# Patient Record
Sex: MALE | Race: WHITE | Employment: STUDENT | URBAN - METROPOLITAN AREA
[De-identification: names, ages, dates, MRNs, and addresses within clinical notes are randomized per-mention and may not be internally consistent; named-entity substitution may affect disease eponyms.]

---
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
## 2022-01-31 ENCOUNTER — ANESTHESIA EVENT (OUTPATIENT)
Dept: SURGERY | Age: 22
End: 2022-01-31
Payer: COMMERCIAL

## 2022-02-01 ENCOUNTER — APPOINTMENT (OUTPATIENT)
Dept: GENERAL RADIOLOGY | Age: 22
End: 2022-02-01
Attending: ORTHOPAEDIC SURGERY
Payer: COMMERCIAL

## 2022-02-01 ENCOUNTER — HOSPITAL ENCOUNTER (OUTPATIENT)
Age: 22
Setting detail: OUTPATIENT SURGERY
Discharge: HOME | End: 2022-02-01
Attending: ORTHOPAEDIC SURGERY | Admitting: ORTHOPAEDIC SURGERY
Payer: COMMERCIAL

## 2022-02-01 ENCOUNTER — ANESTHESIA (OUTPATIENT)
Dept: SURGERY | Age: 22
End: 2022-02-01
Payer: COMMERCIAL

## 2022-02-01 VITALS
RESPIRATION RATE: 16 BRPM | OXYGEN SATURATION: 100 % | WEIGHT: 252 LBS | HEART RATE: 78 BPM | TEMPERATURE: 98.9 F | BODY MASS INDEX: 32.35 KG/M2 | SYSTOLIC BLOOD PRESSURE: 112 MMHG | DIASTOLIC BLOOD PRESSURE: 69 MMHG

## 2022-02-01 DIAGNOSIS — G89.18 ACUTE POST-OPERATIVE PAIN: Primary | ICD-10-CM

## 2022-02-01 PROCEDURE — 74011250637 HC RX REV CODE- 250/637: Performed by: ANESTHESIOLOGY

## 2022-02-01 PROCEDURE — 2709999900 HC NON-CHARGEABLE SUPPLY: Performed by: ORTHOPAEDIC SURGERY

## 2022-02-01 PROCEDURE — 74011250636 HC RX REV CODE- 250/636: Performed by: ANESTHESIOLOGY

## 2022-02-01 PROCEDURE — 74011250636 HC RX REV CODE- 250/636: Performed by: NURSE PRACTITIONER

## 2022-02-01 PROCEDURE — 20680 REMOVAL OF IMPLANT DEEP: CPT | Performed by: ORTHOPAEDIC SURGERY

## 2022-02-01 PROCEDURE — 74011000250 HC RX REV CODE- 250: Performed by: NURSE ANESTHETIST, CERTIFIED REGISTERED

## 2022-02-01 PROCEDURE — 74011250636 HC RX REV CODE- 250/636: Performed by: NURSE ANESTHETIST, CERTIFIED REGISTERED

## 2022-02-01 PROCEDURE — 77030002933 HC SUT MCRYL J&J -A: Performed by: ORTHOPAEDIC SURGERY

## 2022-02-01 PROCEDURE — 74011000250 HC RX REV CODE- 250: Performed by: ORTHOPAEDIC SURGERY

## 2022-02-01 PROCEDURE — 76210000020 HC REC RM PH II FIRST 0.5 HR: Performed by: ORTHOPAEDIC SURGERY

## 2022-02-01 PROCEDURE — 76010000160 HC OR TIME 0.5 TO 1 HR INTENSV-TIER 1: Performed by: ORTHOPAEDIC SURGERY

## 2022-02-01 PROCEDURE — 77030008825 HC WRE FIX K ZIMM -B: Performed by: ORTHOPAEDIC SURGERY

## 2022-02-01 PROCEDURE — 76060000032 HC ANESTHESIA 0.5 TO 1 HR: Performed by: ORTHOPAEDIC SURGERY

## 2022-02-01 PROCEDURE — 76210000006 HC OR PH I REC 0.5 TO 1 HR: Performed by: ORTHOPAEDIC SURGERY

## 2022-02-01 RX ORDER — BUPIVACAINE HYDROCHLORIDE 5 MG/ML
INJECTION, SOLUTION EPIDURAL; INTRACAUDAL AS NEEDED
Status: DISCONTINUED | OUTPATIENT
Start: 2022-02-01 | End: 2022-02-01 | Stop reason: HOSPADM

## 2022-02-01 RX ORDER — OXYCODONE HYDROCHLORIDE 5 MG/1
5 TABLET ORAL
Qty: 20 TABLET | Refills: 0 | Status: SHIPPED | OUTPATIENT
Start: 2022-02-01 | End: 2022-02-06

## 2022-02-01 RX ORDER — SODIUM CHLORIDE, SODIUM LACTATE, POTASSIUM CHLORIDE, CALCIUM CHLORIDE 600; 310; 30; 20 MG/100ML; MG/100ML; MG/100ML; MG/100ML
75 INJECTION, SOLUTION INTRAVENOUS CONTINUOUS
Status: DISCONTINUED | OUTPATIENT
Start: 2022-02-01 | End: 2022-02-01 | Stop reason: HOSPADM

## 2022-02-01 RX ORDER — SODIUM CHLORIDE 0.9 % (FLUSH) 0.9 %
5-40 SYRINGE (ML) INJECTION AS NEEDED
Status: DISCONTINUED | OUTPATIENT
Start: 2022-02-01 | End: 2022-02-01 | Stop reason: HOSPADM

## 2022-02-01 RX ORDER — PROPOFOL 10 MG/ML
INJECTION, EMULSION INTRAVENOUS AS NEEDED
Status: DISCONTINUED | OUTPATIENT
Start: 2022-02-01 | End: 2022-02-01 | Stop reason: HOSPADM

## 2022-02-01 RX ORDER — SODIUM CHLORIDE 0.9 % (FLUSH) 0.9 %
5-40 SYRINGE (ML) INJECTION EVERY 8 HOURS
Status: DISCONTINUED | OUTPATIENT
Start: 2022-02-01 | End: 2022-02-01 | Stop reason: HOSPADM

## 2022-02-01 RX ORDER — KETOROLAC TROMETHAMINE 30 MG/ML
INJECTION, SOLUTION INTRAMUSCULAR; INTRAVENOUS AS NEEDED
Status: DISCONTINUED | OUTPATIENT
Start: 2022-02-01 | End: 2022-02-01 | Stop reason: HOSPADM

## 2022-02-01 RX ORDER — MIDAZOLAM HYDROCHLORIDE 1 MG/ML
2 INJECTION, SOLUTION INTRAMUSCULAR; INTRAVENOUS
Status: DISCONTINUED | OUTPATIENT
Start: 2022-02-01 | End: 2022-02-01 | Stop reason: HOSPADM

## 2022-02-01 RX ORDER — NALOXONE HYDROCHLORIDE 0.4 MG/ML
0.2 INJECTION, SOLUTION INTRAMUSCULAR; INTRAVENOUS; SUBCUTANEOUS AS NEEDED
Status: DISCONTINUED | OUTPATIENT
Start: 2022-02-01 | End: 2022-02-01 | Stop reason: HOSPADM

## 2022-02-01 RX ORDER — ACETAMINOPHEN 500 MG
1000 TABLET ORAL ONCE
Status: COMPLETED | OUTPATIENT
Start: 2022-02-01 | End: 2022-02-01

## 2022-02-01 RX ORDER — CLINDAMYCIN PHOSPHATE 900 MG/50ML
900 INJECTION INTRAVENOUS ONCE
Status: COMPLETED | OUTPATIENT
Start: 2022-02-01 | End: 2022-02-01

## 2022-02-01 RX ORDER — PROPOFOL 10 MG/ML
INJECTION, EMULSION INTRAVENOUS
Status: DISCONTINUED | OUTPATIENT
Start: 2022-02-01 | End: 2022-02-01 | Stop reason: HOSPADM

## 2022-02-01 RX ORDER — FENTANYL CITRATE 50 UG/ML
INJECTION, SOLUTION INTRAMUSCULAR; INTRAVENOUS AS NEEDED
Status: DISCONTINUED | OUTPATIENT
Start: 2022-02-01 | End: 2022-02-01 | Stop reason: HOSPADM

## 2022-02-01 RX ORDER — CIPROFLOXACIN 750 MG/1
750 TABLET, FILM COATED ORAL 2 TIMES DAILY
Qty: 6 TABLET | Refills: 0 | Status: SHIPPED | OUTPATIENT
Start: 2022-02-01

## 2022-02-01 RX ORDER — LIDOCAINE HYDROCHLORIDE 10 MG/ML
0.1 INJECTION INFILTRATION; PERINEURAL AS NEEDED
Status: DISCONTINUED | OUTPATIENT
Start: 2022-02-01 | End: 2022-02-01 | Stop reason: HOSPADM

## 2022-02-01 RX ORDER — OXYCODONE AND ACETAMINOPHEN 5; 325 MG/1; MG/1
1 TABLET ORAL AS NEEDED
Status: DISCONTINUED | OUTPATIENT
Start: 2022-02-01 | End: 2022-02-01 | Stop reason: HOSPADM

## 2022-02-01 RX ORDER — LIDOCAINE HYDROCHLORIDE 20 MG/ML
INJECTION, SOLUTION EPIDURAL; INFILTRATION; INTRACAUDAL; PERINEURAL AS NEEDED
Status: DISCONTINUED | OUTPATIENT
Start: 2022-02-01 | End: 2022-02-01 | Stop reason: HOSPADM

## 2022-02-01 RX ORDER — HYDROMORPHONE HYDROCHLORIDE 2 MG/ML
0.5 INJECTION, SOLUTION INTRAMUSCULAR; INTRAVENOUS; SUBCUTANEOUS
Status: DISCONTINUED | OUTPATIENT
Start: 2022-02-01 | End: 2022-02-01 | Stop reason: HOSPADM

## 2022-02-01 RX ADMIN — PROPOFOL 40 MG: 10 INJECTION, EMULSION INTRAVENOUS at 10:59

## 2022-02-01 RX ADMIN — PROPOFOL 100 MCG/KG/MIN: 10 INJECTION, EMULSION INTRAVENOUS at 11:03

## 2022-02-01 RX ADMIN — SODIUM CHLORIDE, SODIUM LACTATE, POTASSIUM CHLORIDE, AND CALCIUM CHLORIDE 75 ML/HR: 600; 310; 30; 20 INJECTION, SOLUTION INTRAVENOUS at 08:32

## 2022-02-01 RX ADMIN — PROPOFOL 40 MG: 10 INJECTION, EMULSION INTRAVENOUS at 11:08

## 2022-02-01 RX ADMIN — PROPOFOL 50 MG: 10 INJECTION, EMULSION INTRAVENOUS at 10:57

## 2022-02-01 RX ADMIN — FENTANYL CITRATE 25 MCG: 50 INJECTION INTRAMUSCULAR; INTRAVENOUS at 11:05

## 2022-02-01 RX ADMIN — PROPOFOL 30 MG: 10 INJECTION, EMULSION INTRAVENOUS at 11:23

## 2022-02-01 RX ADMIN — CLINDAMYCIN PHOSPHATE 900 MG: 900 INJECTION, SOLUTION INTRAVENOUS at 10:49

## 2022-02-01 RX ADMIN — KETOROLAC TROMETHAMINE 30 MG: 30 INJECTION, SOLUTION INTRAMUSCULAR; INTRAVENOUS at 11:31

## 2022-02-01 RX ADMIN — FENTANYL CITRATE 50 MCG: 50 INJECTION INTRAMUSCULAR; INTRAVENOUS at 10:57

## 2022-02-01 RX ADMIN — PROPOFOL 40 MG: 10 INJECTION, EMULSION INTRAVENOUS at 11:17

## 2022-02-01 RX ADMIN — FENTANYL CITRATE 25 MCG: 50 INJECTION INTRAMUSCULAR; INTRAVENOUS at 11:02

## 2022-02-01 RX ADMIN — LIDOCAINE HYDROCHLORIDE 40 MG: 20 INJECTION, SOLUTION EPIDURAL; INFILTRATION; INTRACAUDAL; PERINEURAL at 10:57

## 2022-02-01 RX ADMIN — ACETAMINOPHEN 1000 MG: 500 TABLET ORAL at 08:30

## 2022-02-01 NOTE — ANESTHESIA POSTPROCEDURE EVALUATION
Procedure(s):  RIGHT FOOT HARDWARE REMOVAL. total IV anesthesia    Anesthesia Post Evaluation      Multimodal analgesia: multimodal analgesia used between 6 hours prior to anesthesia start to PACU discharge  Patient location during evaluation: PACU  Patient participation: complete - patient participated  Level of consciousness: awake and alert  Pain management: adequate  Airway patency: patent  Anesthetic complications: no  Cardiovascular status: acceptable  Respiratory status: acceptable  Hydration status: acceptable  Post anesthesia nausea and vomiting:  none  Final Post Anesthesia Temperature Assessment:  Normothermia (36.0-37.5 degrees C)      INITIAL Post-op Vital signs:   Vitals Value Taken Time   BP 96/70 02/01/22 1215   Temp 37.1 °C (98.7 °F) 02/01/22 1146   Pulse 82 02/01/22 1218   Resp 16 02/01/22 1215   SpO2 97 % 02/01/22 1218   Vitals shown include unvalidated device data.

## 2022-02-01 NOTE — BRIEF OP NOTE
Brief Postoperative Note    Patient: Vickey Anthony  YOB: 2000  MRN: [id number]    Date of Procedure: 2/1/2022     Pre-Op Diagnosis: Dislocation of tarsometatarsal joint of right foot, initial encounter [S93.324A]    Post-Op Diagnosis: Same as preoperative diagnosis.       Procedure(s):  RIGHT FOOT HARDWARE REMOVAL    Surgeon(s):  Estefani Zarate MD    Surgical Assistant: None    Anesthesia: MAC     Estimated Blood Loss (mL): Minimal    Complications: None    Specimens: * No specimens in log *     Implants: * No implants in log *    Drains: * No LDAs found *    Findings:     Electronically Signed by Danitza Domínguez MD on 2/1/2022 at 11:43 AM

## 2022-02-01 NOTE — OP NOTES
FULL OP NOTE    PATIENT NAME: Mari Johnson  MRN: [id number]    DATE OF SURGERY: 2/1/2022    PREOPERATIVE DIAGNOSIS: Dislocation of tarsometatarsal joint of right foot, initial encounter [S93.324A]      POSTOPERATIVE DIAGNOSIS: Dislocation of tarsometatarsal joint of right foot, initial encounter [S93.324A]      PROCEDURE: Right foot planned staged hardware removal, 20680    SURGEON: Anatoly Farfan MD    ASSISTANT: Mohamud Coats NP  An assistant was required for positioning, retraction, and wound closure for this procedure. HARDWARE: * No implants in log *  INDICATIONS: This patient is a 25y.o. year old male with a history of Dislocation of tarsometatarsal joint of right foot, initial encounter Constance Ren who has failed conservative therapy and desires surgical treatment. Risks and benefits of the procedure including, but not limited to, anesthetic complications as well as surgical complications including damage to nerves and blood vessels, risk of infection, risk of incomplete pain relief, risk of malunion, nonunion and need for additional surgery have been discussed with the patient who wishes to proceed. PROCEDURE IN DETAIL: A time out was done to confirm the operating procedure, surgeon, patient and site. During a preop surgical timeout the right lower extremity was identified as the correct surgical site and prepped and draped in the standard sterile fashion using ChloraPrep solution. A field block was obtained with 0.5% plain Marcaine. The patient's previous dorsal approach to the TMT joint was then reopened. The underlying hardware was identified and was removed without difficulty. There was good stability with anatomic reduction noted in the first TMT joint. The medial approach to the Lisfranc screw was also opened in a separate incision. The screw was also removed without difficulty. Image guidance confirmed anatomic reduction with adequate removal of hardware.   The wounds were then irrigated and closed using Monocryl nylon sutures. A sterile dressing was then applied. Anesthesia was discontinued. The patient was transferred back to recovery bed. He was taken to recovery in satisfactory condition. Appear to tolerate the procedure well. There were no apparent surgical or anesthetic complications. All needle and sponge counts were correct. TOURNIQUET TIME: Approx 25  minutes. SPECIMENS: none    ESTIMATED BLOOD LOSS: min mL.

## 2022-02-01 NOTE — PERIOP NOTES
Discharge instructions reviewed with family and patient at bedside. All questions answered at this time. Handout in hand on family.

## 2022-02-01 NOTE — ANESTHESIA PREPROCEDURE EVALUATION
Relevant Problems   No relevant active problems       Anesthetic History   No history of anesthetic complications            Review of Systems / Medical History  Patient summary reviewed, nursing notes reviewed and pertinent labs reviewed    Pulmonary  Within defined limits                 Neuro/Psych   Within defined limits           Cardiovascular                  Exercise tolerance: >4 METS     GI/Hepatic/Renal  Within defined limits              Endo/Other  Within defined limits           Other Findings              Physical Exam    Airway  Mallampati: II  TM Distance: 4 - 6 cm  Neck ROM: normal range of motion   Mouth opening: Normal     Cardiovascular  Regular rate and rhythm,  S1 and S2 normal,  no murmur, click, rub, or gallop             Dental  No notable dental hx       Pulmonary  Breath sounds clear to auscultation               Abdominal         Other Findings            Anesthetic Plan    ASA: 1  Anesthesia type: total IV anesthesia          Induction: Intravenous  Anesthetic plan and risks discussed with: Patient and Parent / Delilah Bruce

## 2022-02-01 NOTE — H&P
Outpatient Surgery History and Physical:  Luciana Moses was seen and examined. CHIEF COMPLAINT:   Right foot. PE:     Visit Vitals  /68 (BP 1 Location: Right arm, BP Patient Position: Supine)   Pulse 75   Temp 97.9 °F (36.6 °C)   Resp 18   Wt 252 lb (114.3 kg)   SpO2 96%   BMI 32.35 kg/m²       Heart:   Regular rhythm      Lungs:  Are clear      Past Medical History: There are no problems to display for this patient. Surgical History:   Past Surgical History:   Procedure Laterality Date    HX ORTHOPAEDIC Right 11/2021    RIGHT FOOT FIRST AND SECOND TARSOMETATARSAL OPEN REDUCTION INTERNAL FIXATION    HX WISDOM TEETH EXTRACTION         Social History: Patient  reports that he has never smoked. He has never used smokeless tobacco. He reports previous alcohol use. He reports previous drug use. Family History: No family history on file. Allergies: Reviewed per EMR  Allergies   Allergen Reactions    Pcn [Penicillins] Rash       Medications:    No current facility-administered medications on file prior to encounter. Current Outpatient Medications on File Prior to Encounter   Medication Sig    Cetirizine (ZyrTEC) 10 mg cap Take  by mouth nightly.  creatine monohydrate (CREATINE PO) Take  by mouth daily. The surgery is planned for the RIGHT FOOT HARDWARE REMOVAL . History and physical has been reviewed. The patient has been examined. There have been no significant clinical changes since the completion of the originally dated History and Physical.  Patient identified by surgeon; surgical site was confirmed by patient and surgeon. The patient is here today for outpatient surgery. I have examined the patient, no changes are noted in the patient's medical status. Necessity for the procedure/care is still present and the history and physical above is current. See the office notes for the full long term history of the problem.   Please see the recent office notes for the musculoskeletal examination.     Signed By: Esther Sewell NP     February 1, 2022 8:37 AM

## 2022-02-17 ENCOUNTER — HOSPITAL ENCOUNTER (OUTPATIENT)
Dept: PHYSICAL THERAPY | Age: 22
Discharge: HOME | End: 2022-02-17
Payer: COMMERCIAL

## 2022-02-17 DIAGNOSIS — S93.324A DISLOCATION OF TARSOMETATARSAL JOINT OF RIGHT FOOT, INITIAL ENCOUNTER: ICD-10-CM

## 2022-02-17 PROCEDURE — 97161 PT EVAL LOW COMPLEX 20 MIN: CPT

## 2022-02-17 PROCEDURE — 97110 THERAPEUTIC EXERCISES: CPT

## 2022-02-17 NOTE — THERAPY EVALUATION
Yvonna Delay  : 2000 [facility] [address]  [address] [place] [address], [place] [address].  [phone]   [phone]        OUTPATIENT PHYSICAL THERAPY:Initial Assessment 2022         ICD-10: Treatment Diagnosis: Pain in right foot (M79.671) and Stiffness of right foot, not elsewhere classified (M25.674) and Difficulty in walking, not elsewhere classified (R26.2)  Precautions/Allergies:   Pcn [penicillins]   Fall Risk Score:     Ambulatory/Rehab Services H2 Model Falls Risk Assessment    Risk Factors:       (1)  Gender [Male] Ability to Rise from Chair:       (0)  Ability to rise in a single movement    Falls Prevention Plan:       No modifications necessary   Total: (5 or greater = High Risk): 1     [facility]. All Rights Reserved. [facility] [employer] Patent #[id number]. Federal Law prohibits the replication, distribution or use without written permission from [facility]     MD Orders: evaluate and treat MEDICAL/REFERRING DIAGNOSIS:  Dislocation of tarsometatarsal joint of right foot, initial encounter [R73.069T]   DATE OF ONSET: s/p ORIF of lis-franc on . Hardware removal on . REFERRING PHYSICIAN: Vernida Felty, MD  RETURN PHYSICIAN APPOINTMENT: not currrently scheduled     INITIAL ASSESSMENT:  Mr. Tiffani Palmer presents to therapy following above surgical repair. He has decreased mobility of the foot and ankle, specifically of the talocrural joint and tightness of the calf musculature. He has an appropriately stable midfoot. He has decreased strength throughout the foot and lower leg due to atrophy and immobilization. He has pain with plantarflexion and weightbearing through the forefoot. He also demonstrates weakness of the intrinsic foot musculature. Impairments cause difficulty with all ambulation activities. Skilled therapy is required to return to prior level of function.     PROBLEM LIST (Impacting functional limitations):  1. Decreased Strength  2. Decreased ADL/Functional Activities  3. Decreased Ambulation Ability/Technique  4. Decreased Balance  5. Increased Pain  6. Decreased Activity Tolerance  7. Decreased Flexibility/Joint Mobility INTERVENTIONS PLANNED:  1. Balance Exercise  2. Family Education  3. Gait Training  4. Home Exercise Program (HEP)  5. Manual Therapy  6. Neuromuscular Re-education/Strengthening  7. Range of Motion (ROM)  8. Therapeutic Activites  9. Therapeutic Exercise/Strengthening  10. modalities    TREATMENT PLAN:  Effective Dates: 2/17/2022 TO 5/19/2022 (90 days). Frequency/Duration: 3 times a week for 90 Days  GOALS: (Goals have been discussed and agreed upon with patient.)  Short-Term Functional Goals: Time Frame: 2 weeks  1. Patient will be independent with HEP. 2. Patient will restore ankle mobility to symmetric with uninvolved as measured by weight-bearing lunge test to normalize gait on uneven surfaces. 3. Patient will report pain <2/10 along incision sites to decrease sensitivity. Discharge Goals: Time Frame: 4 weeks  1. Patient will be able to perform 30 SL heel raises to normalize capacity of the calf for return to prior level of function. 2. Patient will demonstrate symmetric SL hop and stick to normalize dynamic stability of the limb. 3. Patient will have LEFS of 80/80 to return to prior level of function. Rehabilitation Potential For Stated Goals: Excellent  Regarding Chelsea Buckner's therapy, I certify that the treatment plan above will be carried out by a therapist or under their direction. Thank you for this referral,  Yonatan Hong DPT       Referring Physician Signature: Darcy Mcbride MD              Date                      HISTORY:   History of Present Injury/Illness (Reason for Referral):  Patient presents to therapy following above surgical repair. Initial injury occurred on 10/30/21 while playing football.  The forefoot was stepped on and he felt a sharp pain when he tried to pull his foot away. He notes no complications with surgery. Following initial surgery, he was casted for 6 weeks, booted for another 4 weeks, and then progressed to shoes. Following hardware removal, he was in a boot until yesterday. He currently notes general soreness through the foot and feels sensitive along the incision sites. He also feels tight through the ankle and calf, and feels weak through the foot. He does note mild intermittent swelling, and he has been intermittently taking advil to manage soreness. Past Medical History/Comorbidities:   Mr. Luis Bowling  has no past medical history on file. Mr. Luis Bowling  has a past surgical history that includes hx wisdom teeth extraction and hx orthopaedic (Right, 11/2021). Social History/Living Environment:     Patient is a college student and lives on campus. 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 , Disp: , Rfl:    Date Last Reviewed:  2/17/2022   # of Personal Factors/Comorbidities that affect the Plan of Care: 0: LOW COMPLEXITY   EXAMINATION:   Observation/Orthostatic Postural Assessment:    Incision sites are well healed. Ambulates with mild decreased ankle rocker during right stance. Girth: 40.5cm R, 44cm L at calf; 29.5cm R, 28.5cm L at midfoot; figure 8 is 58cm B  Palpation:    Mild tenderness along incision sites  ROM:    WBLT: 30 ° R, 35 ° L  Soleus length is 10 ° in prone  Inversion, eversion, and plantarflexion are WNL  Great toe extension is 60 ° B   Hip extension and quad length are restricted  Strength:    Ankle DF: 4/5 R; 5/5 L  Inversion: 4/5 R with pain; 5/5 L  Eversion: 5/5 B  Plantarflexion 3/5 R with pain; 5/5 L  Special Tests:    Not applicable  Neurological Screen:  Grossly intact  Functional Mobility:   WNL  Balance:    Maintains single leg stance 5sec on R; 10+ on L   Body Structures Involved:  1. Nerves  2. Bones  3. Joints  4. Muscles  5. Ligaments Body Functions Affected:  1. Sensory/Pain  2. Neuromusculoskeletal  3. Movement Related Activities and Participation Affected:  1. General Tasks and Demands  2. Mobility  3. Self Care  4. Domestic Life  5. Interpersonal Interactions and Relationships  6. Community, Social and [place]   # of elements that affect the Plan of Care: 1-2: LOW COMPLEXITY   CLINICAL PRESENTATION:   Presentation: Stable and uncomplicated: LOW COMPLEXITY   CLINICAL DECISION MAKING:   Tool Used: Lower Extremity Functional Scale (LEFS)  Score:  Initial: 64/80 Most Recent: X/80 (Date: -- )   Interpretation of Score: 20 questions each scored on a 5 point scale with 0 representing \"extreme difficulty or unable to perform\" and 4 representing \"no difficulty\". The lower the score, the greater the functional disability. 80/80 represents no disability. Minimal detectable change is 9 points. Medical Necessity:   · Patient is expected to demonstrate progress in strength, range of motion, balance and coordination  ·  to increase independence with community mobility and return to prior level of function  · .  Reason for Services/Other Comments:  · Patient has demonstrated an improvement in functional level by independent performance of HEP.    · .   Use of outcome tool(s) and clinical judgement create a POC that gives a: Clear prediction of patient's progress: LOW COMPLEXITY     Total Treatment Duration:  PT Patient Time In/Time Out  Time In: 1400  Time Out: [facility], [facility]

## 2022-02-17 NOTE — PROGRESS NOTES
Obdulia Gomes [place]  : 2000  Primary: [address]  Secondary: [place] [address]  [phone]   [phone]      OUTPATIENT PHYSICAL THERAPY: Daily Treatment Note  2022    ICD-10: Treatment Diagnosis: Pain in right foot (M79.671) and Stiffness of right foot, not elsewhere classified (M25.674) and Difficulty in walking, not elsewhere classified (R26.2)  Effective Dates: 2022 TO 2022 (90 days). Frequency/Duration: 3 times a week for 90 Days  GOALS: (Goals have been discussed and agreed upon with patient.)  Short-Term Functional Goals: Time Frame: 2 weeks  1. Patient will be independent with HEP. 2. Patient will restore ankle mobility to symmetric with uninvolved as measured by weight-bearing lunge test to normalize gait on uneven surfaces. 3. Patient will report pain <2/10 along incision sites to decrease sensitivity. Discharge Goals: Time Frame: 4 weeks  1. Patient will be able to perform 30 SL heel raises to normalize capacity of the calf for return to prior level of function. 2. Patient will demonstrate symmetric SL hop and stick to normalize dynamic stability of the limb. 3. Patient will have LEFS of 80/80 to return to prior level of function. _________________________________________________________________________  Pre-treatment Symptoms/Complaints:  See initial evaluation. Pain: Initial: 210 Post Session:  No increase/10   Medications Last Reviewed:  2022  Updated Objective Findings:  Below measures from initial evaluation unless otherwise noted. Observation/Orthostatic Postural Assessment:    Incision sites are well healed. Ambulates with mild decreased ankle rocker during right stance.    Girth: 40.5cm R, 44cm L at calf; 29.5cm R, 28.5cm L at midfoot; figure 8 is 58cm B  Palpation:    Mild tenderness along incision sites  ROM: WBLT: 30 ° R, 35 ° L  Soleus length is 10 ° in prone  Inversion, eversion, and plantarflexion are WNL  Great toe extension is 60 ° B   Hip extension and quad length are restricted  Strength: Ankle DF: 4/5 R; 5/5 L  Inversion: 4/5 R with pain; 5/5 L  Eversion: 5/5 B  Plantarflexion 3/5 R with pain; 5/5 L  Special Tests:    Not applicable  Neurological Screen:  Grossly intact  Functional Mobility:   WNL  Balance:    Maintains single leg stance 5sec on R; 10+ on L  LEFS: 64/80     TREATMENT:   THERAPEUTIC ACTIVITY: ( see below for minutes): Therapeutic activities per grid below to improve mobility, strength, balance and coordination. Required minimal visual, verbal, manual and tactile cues to improve independence and safety with daily activities . THERAPEUTIC EXERCISE: (see below for minutes):  Exercises per grid below to improve mobility, strength, balance and coordination. Required minimal verbal and manual cues to promote proper body alignment, promote proper body posture and promote proper body mechanics. Progressed resistance, range, repetitions and complexity of movement as indicated. MANUAL THERAPY: (see below for minutes): Joint mobilization and Soft tissue mobilization was utilized and necessary because of the patient's restricted joint motion, painful spasm, loss of articular motion and restricted motion of soft tissue. MODALITIES: (see below for minutes):      to decrease pain    SELF CARE: (see below for minutes): Procedure(s) (per grid) utilized to improve and/or restore self-care/home management as related to dressing, bathing and grooming. Required minimal verbal cueing to facilitate activities of daily living skills and compensatory activities.      Date: 2/17/22       Modalities:                                Therapeutic Exercise: 25 min       Active warm up Knee hug, lunge x 1 lap       Incline board 1 min knee straight, bent each       Tband In/DF/Ev 3x15 each (green); seated toe flexion green 40x       Toe raise 3x15 leaning on wall                       Proprioceptive Activities:                                Manual Therapy:                        Therapeutic Activities:                                  HEP: see 2/17/22 flow sheet for specifics. (calf stretch at wall knees straight and bent 1 min ea), SLS with EC [id number], toe raise 3x15    MedBridge Portal  Treatment/Session Summary:    · Response to Treatment:  Patient is independent with performance of above described home program.  · Communication/Consultation:  None today  · Equipment provided today:  None today  · Recommendations/Intent for next treatment session: Next visit will focus on progression of strength and return to prior level of function.     Total Treatment Billable Duration:  45 minutes  PT Patient Time In/Time Out  Time In: 1400  Time Out: 301 [facility] 83, DPT    Future Appointments   Date Time Provider Dulce Maria Shafer   2/22/2022  2:45 PM Claudine Ferreira, KATIET [facility]   2/24/2022  2:45 PM Claudine Ferreira, KATIET [facility]   3/7/2022  8:45 AM Ferrer Apa, PT [facility]   3/9/2022  8:45 AM Ferrer Apa, PT [facility]   3/15/2022  4:15 PM Ferrer Apa, PT [facility]   3/17/2022  2:00 PM Ferrer Apa, PT [facility]   3/22/2022  4:15 PM Ferrer Apa, PT [facility]   3/24/2022  2:00 PM Ferrer Apa, PT [facility]   3/29/2022  4:15 PM Ferrer Apa, PT [facility]   3/31/2022  2:00 PM Norbertfman, Perla Blend, [facility]

## 2022-02-22 ENCOUNTER — HOSPITAL ENCOUNTER (OUTPATIENT)
Dept: PHYSICAL THERAPY | Age: 22
Discharge: HOME | End: 2022-02-22
Payer: COMMERCIAL

## 2022-02-22 PROCEDURE — 97110 THERAPEUTIC EXERCISES: CPT

## 2022-02-22 NOTE — PROGRESS NOTES
Linh Albrecht [place]  : 2000  Primary: [address]  Secondary: [place] [address]  [phone]   [phone]      OUTPATIENT PHYSICAL THERAPY: Daily Treatment Note  2022    ICD-10: Treatment Diagnosis: Pain in right foot (M79.671) and Stiffness of right foot, not elsewhere classified (M25.674) and Difficulty in walking, not elsewhere classified (R26.2)  Effective Dates: 2022 TO 2022 (90 days). Frequency/Duration: 3 times a week for 90 Days  GOALS: (Goals have been discussed and agreed upon with patient.)  Short-Term Functional Goals: Time Frame: 2 weeks  1. Patient will be independent with HEP. 2. Patient will restore ankle mobility to symmetric with uninvolved as measured by weight-bearing lunge test to normalize gait on uneven surfaces. 3. Patient will report pain <2/10 along incision sites to decrease sensitivity. Discharge Goals: Time Frame: 4 weeks  1. Patient will be able to perform 30 SL heel raises to normalize capacity of the calf for return to prior level of function. 2. Patient will demonstrate symmetric SL hop and stick to normalize dynamic stability of the limb. 3. Patient will have LEFS of 80/80 to return to prior level of function. _________________________________________________________________________  Pre-treatment Symptoms/Complaints:  Notes the incision still remains moderately tender. Feeling more comfortable walking around campus. Pain: Initial: 2/10    Hardware removal on: 22 Post Session:  No increase/10   Medications Last Reviewed:  2022  Updated Objective Findings:  Below measures from initial evaluation unless otherwise noted. Observation/Orthostatic Postural Assessment:    Incision sites are well healed. Ambulates with mild decreased ankle rocker during right stance.    Girth: 40.5cm R, 44cm L at calf; 29.5cm R, 28.5cm L at midfoot; figure 8 is 58cm B  Palpation:    Mild tenderness along incision sites  ROM:    WBLT: 30 ° R, 35 ° L  Soleus length is 10 ° in prone  Inversion, eversion, and plantarflexion are WNL  Great toe extension is 60 ° B   Hip extension and quad length are restricted  Strength: Ankle DF: 4/5 R; 5/5 L  Inversion: 4/5 R with pain; 5/5 L  Eversion: 5/5 B  Plantarflexion 3/5 R with pain; 5/5 L  Special Tests:    Not applicable  Neurological Screen:  Grossly intact  Functional Mobility:   WNL  Balance:    Maintains single leg stance 5sec on R; 10+ on L  LEFS: 64/80     TREATMENT:   THERAPEUTIC ACTIVITY: ( see below for minutes): Therapeutic activities per grid below to improve mobility, strength, balance and coordination. Required minimal visual, verbal, manual and tactile cues to improve independence and safety with daily activities . THERAPEUTIC EXERCISE: (see below for minutes):  Exercises per grid below to improve mobility, strength, balance and coordination. Required minimal verbal and manual cues to promote proper body alignment, promote proper body posture and promote proper body mechanics. Progressed resistance, range, repetitions and complexity of movement as indicated. MANUAL THERAPY: (see below for minutes): Joint mobilization and Soft tissue mobilization was utilized and necessary because of the patient's restricted joint motion, painful spasm, loss of articular motion and restricted motion of soft tissue. MODALITIES: (see below for minutes):      to decrease pain    SELF CARE: (see below for minutes): Procedure(s) (per grid) utilized to improve and/or restore self-care/home management as related to dressing, bathing and grooming. Required minimal verbal cueing to facilitate activities of daily living skills and compensatory activities.      Date: 2/17/22 2/22/22 (visit 2)      Modalities:                                Therapeutic Exercise: 25 min 40 min       Active warm up Knee hug, lunge x 1 lap Knee hug, SLS to heel raise with OH med ball press x1 lap each      Incline board 1 min knee straight, bent each 1 min knee straight and bent each      Tband In/DF/Ev 3x15 each (green); seated toe flexion green 40x Seated toe press green 50x      Toe raise 3x15 leaning on wall 3x20 leaning on wall      bike  5 min       MOBO  Static stance 2x1 min, with med/lat reach 1 min       SLDL   With KB pass through 15# [id number]      Heel raise  Standing 15x double, 3x8 SL; seated 100# 4x8                      Proprioceptive Activities:                                Manual Therapy:  5 min         MWM into closed chain DF; STM to medial incision              Therapeutic Activities:                                  HEP: see 2/17/22 flow sheet for specifics. (calf stretch at wall knees straight and bent 1 min ea), SLS with EC [id number], toe raise 3x15    MedBridge Portal  Treatment/Session Summary:    · Response to Treatment:  Following MWM he has no pinching at the anterior ankle with calf stretching. · Communication/Consultation:  None today  · Equipment provided today:  None today  · Recommendations/Intent for next treatment session: Next visit will focus on progression of strength and return to prior level of function.     Total Treatment Billable Duration:  45 minutes  PT Patient Time In/Time Out  Time In: 2314  Time Out: 1400 [facility], CATHERINE    Future Appointments   Date Time Provider Dulce Maria Shafer   2/24/2022  2:45 PM Edson Ramirez DPT [facility]   3/9/2022  7:00 PM Jaycee Stanley, PT [facility]   3/15/2022  4:15 PM Jaycee Stanley, PT [facility]   3/17/2022  2:00 PM Jaycee Stanley, PT [facility]   3/22/2022  4:15 PM Jaycee Stanley, PT [facility]   3/24/2022  2:00 PM [facility]   3/29/2022  4:15 PM Jaycee Stanley, PT [facility]   3/31/2022  2:00 PM Sharri Underwood, PT [facility]

## 2022-02-24 ENCOUNTER — HOSPITAL ENCOUNTER (OUTPATIENT)
Dept: PHYSICAL THERAPY | Age: 22
Discharge: HOME | End: 2022-02-24
Payer: COMMERCIAL

## 2022-02-24 PROCEDURE — 97110 THERAPEUTIC EXERCISES: CPT

## 2022-02-24 PROCEDURE — 97022 WHIRLPOOL THERAPY: CPT

## 2022-02-24 NOTE — PROGRESS NOTES
Michael Potter [place]  : 2000  Primary: [address]mo[address]c*  Secondary: [place] [address]  [phone]   [phone]      OUTPATIENT PHYSICAL THERAPY: Daily Treatment Note  2022    ICD-10: Treatment Diagnosis: Pain in right foot (M79.671) and Stiffness of right foot, not elsewhere classified (M25.674) and Difficulty in walking, not elsewhere classified (R26.2)  Effective Dates: 2022 TO 2022 (90 days). Frequency/Duration: 3 times a week for 90 Days  GOALS: (Goals have been discussed and agreed upon with patient.)  Short-Term Functional Goals: Time Frame: 2 weeks  1. Patient will be independent with HEP. 2. Patient will restore ankle mobility to symmetric with uninvolved as measured by weight-bearing lunge test to normalize gait on uneven surfaces. 3. Patient will report pain <2/10 along incision sites to decrease sensitivity. Discharge Goals: Time Frame: 4 weeks  1. Patient will be able to perform 30 SL heel raises to normalize capacity of the calf for return to prior level of function. 2. Patient will demonstrate symmetric SL hop and stick to normalize dynamic stability of the limb. 3. Patient will have LEFS of 80/80 to return to prior level of function. _________________________________________________________________________  Pre-treatment Symptoms/Complaints:  \"It's still a little sore, but it's feeling better. \"   Pain: Initial: 2/10    Hardware removal on: 22 Post Session:  No increase/10   Medications Last Reviewed:  2022  Updated Objective Findings:  Below measures from initial evaluation unless otherwise noted. Observation/Orthostatic Postural Assessment:    Incision sites are well healed. Ambulates with mild decreased ankle rocker during right stance.    Girth: 40.5cm R, 44cm L at calf; 29.5cm R, 28.5cm L at midfoot; figure 8 is 58cm B  Palpation:    Mild tenderness along incision sites  ROM:    WBLT: 30 ° R, 35 ° L  Soleus length is 10 ° in prone  Inversion, eversion, and plantarflexion are WNL  Great toe extension is 60 ° B   Hip extension and quad length are restricted  Strength: Ankle DF: 4/5 R; 5/5 L  Inversion: 4/5 R with pain; 5/5 L  Eversion: 5/5 B  Plantarflexion 3/5 R with pain; 5/5 L  Special Tests:    Not applicable  Neurological Screen:  Grossly intact  Functional Mobility:   WNL  Balance:    Maintains single leg stance 5sec on R; 10+ on L  LEFS: 64/80     TREATMENT:   THERAPEUTIC ACTIVITY: ( see below for minutes): Therapeutic activities per grid below to improve mobility, strength, balance and coordination. Required minimal visual, verbal, manual and tactile cues to improve independence and safety with daily activities . THERAPEUTIC EXERCISE: (see below for minutes):  Exercises per grid below to improve mobility, strength, balance and coordination. Required minimal verbal and manual cues to promote proper body alignment, promote proper body posture and promote proper body mechanics. Progressed resistance, range, repetitions and complexity of movement as indicated. MANUAL THERAPY: (see below for minutes): Joint mobilization and Soft tissue mobilization was utilized and necessary because of the patient's restricted joint motion, painful spasm, loss of articular motion and restricted motion of soft tissue. MODALITIES: (see below for minutes):      to decrease pain    SELF CARE: (see below for minutes): Procedure(s) (per grid) utilized to improve and/or restore self-care/home management as related to dressing, bathing and grooming. Required minimal verbal cueing to facilitate activities of daily living skills and compensatory activities.      Date: 2/17/22 2/22/22 (visit 2) 2/24/22 (visit 3)     Modalities:   10 min         Cold whirlpool for pain and swelling                     Therapeutic Exercise: 25 min 40 min  45 min Active warm up Knee hug, lunge x 1 lap Knee hug, SLS to heel raise with OH med ball press x1 lap each Knee hug, SLS to heel raise with OH med ball press x 1 lap each     Incline board 1 min knee straight, bent each 1 min knee straight and bent each repeat     Tband In/DF/Ev 3x15 each (green); seated toe flexion green 40x Seated toe press green 50x Lateral band walk at forefoot grey x 3 laps     Toe raise 3x15 leaning on wall 3x20 leaning on wall Shuffle walk x 1 lap     bike  5 min       MOBO  Static stance 2x1 min, with med/lat reach 1 min  repeat     SLDL   With KB pass through 15# [id number]      Heel raise  Standing 15x double, 3x8 SL; seated 100# 4x8 Standing 4x10 SL L1; seated 125# x10, 150# x10, 175# 2x10     RFESS   3x10 with heel raise at bottom             Proprioceptive Activities:                                Manual Therapy:  5 min  5 min        MWM into closed chain DF; STM to medial incision repeat             Therapeutic Activities:                                  HEP: see 2/17/22 flow sheet for specifics. (calf stretch at wall knees straight and bent 1 min ea), SLS with EC [id number], toe raise 3x15    [facility] Portal  Treatment/Session Summary:    · Response to Treatment:  Able to perform heel raises pain free. · Communication/Consultation:  None today  · Equipment provided today:  None today  · Recommendations/Intent for next treatment session: Next visit will focus on progression of strength and return to prior level of function.     Total Treatment Billable Duration:  55 minutes  PT Patient Time In/Time Out  Time In: 2440  Time Out: [address] [facility] Wally, DPT    Future Appointments   Date Time Provider Dulce Maria Shafer   2/28/2022  2:00 PM Chaparrita Lagos DPT [facility]   3/2/2022  2:45 PM Emily Ee, PT [facility]   3/9/2022  7:00 PM Emily Ee, PT [facility]   3/15/2022  4:15 PM Emily Ee, PT [facility]   3/17/2022  2:00 PM Eldon Chambers BESS, PT [facility]   3/22/2022  4:15 PM [facility]   3[facility]24/2022  2:00 [facility]   3/29/2022  4:15 PM Amina Glaser, PT [facility]   3/31/2022  2:00 PM [facility]endo[facility]

## 2022-02-28 ENCOUNTER — HOSPITAL ENCOUNTER (OUTPATIENT)
Dept: PHYSICAL THERAPY | Age: 22
Discharge: HOME | End: 2022-02-28
Payer: COMMERCIAL

## 2022-02-28 PROCEDURE — 97110 THERAPEUTIC EXERCISES: CPT

## 2022-02-28 PROCEDURE — 97022 WHIRLPOOL THERAPY: CPT

## 2022-02-28 NOTE — PROGRESS NOTES
Jade Holland [place]  : 2000  Primary: [address]*  Secondary: [place] [address] @ [address]  [phone]   [phone]      OUTPATIENT PHYSICAL THERAPY: Daily Treatment Note  2022    ICD-10: Treatment Diagnosis: Pain in right foot (M79.671) and Stiffness of right foot, not elsewhere classified (M25.674) and Difficulty in walking, not elsewhere classified (R26.2)  Effective Dates: 2022 TO 2022 (90 days). Frequency/Duration: 3 times a week for 90 Days  GOALS: (Goals have been discussed and agreed upon with patient.)  Short-Term Functional Goals: Time Frame: 2 weeks  1. Patient will be independent with HEP. 2. Patient will restore ankle mobility to symmetric with uninvolved as measured by weight-bearing lunge test to normalize gait on uneven surfaces. 3. Patient will report pain <2/10 along incision sites to decrease sensitivity. Discharge Goals: Time Frame: 4 weeks  1. Patient will be able to perform 30 SL heel raises to normalize capacity of the calf for return to prior level of function. 2. Patient will demonstrate symmetric SL hop and stick to normalize dynamic stability of the limb. 3. Patient will have LEFS of 80/80 to return to prior level of function. _________________________________________________________________________  Pre-treatment Symptoms/Complaints:  \"I was on my feet a good bit this weekend and it was a little sore, but not bad. \"  Pain: Initial: 2/10    Hardware removal on: 22 Post Session:  No increase/10   Medications Last Reviewed:  2022  Updated Objective Findings:  Below measures from initial evaluation unless otherwise noted. Observation/Orthostatic Postural Assessment:    Incision sites are well healed. Ambulates with mild decreased ankle rocker during right stance.    Girth: 40.5cm R, 44cm L at calf; 29.5cm R, 28.5cm L at midfoot; figure 8 is 58cm B  Palpation:    Mild tenderness along incision sites  ROM:    WBLT: 30 ° R, 35 ° L  Soleus length is 10 ° in prone  Inversion, eversion, and plantarflexion are WNL  Great toe extension is 60 ° B   Hip extension and quad length are restricted  Strength: Ankle DF: 4/5 R; 5/5 L  Inversion: 4/5 R with pain; 5/5 L  Eversion: 5/5 B  Plantarflexion 3/5 R with pain; 5/5 L  Special Tests:    Not applicable  Neurological Screen:  Grossly intact  Functional Mobility:   WNL  Balance:    Maintains single leg stance 5sec on R; 10+ on L  LEFS: 64/80     TREATMENT:   THERAPEUTIC ACTIVITY: ( see below for minutes): Therapeutic activities per grid below to improve mobility, strength, balance and coordination. Required minimal visual, verbal, manual and tactile cues to improve independence and safety with daily activities . THERAPEUTIC EXERCISE: (see below for minutes):  Exercises per grid below to improve mobility, strength, balance and coordination. Required minimal verbal and manual cues to promote proper body alignment, promote proper body posture and promote proper body mechanics. Progressed resistance, range, repetitions and complexity of movement as indicated. MANUAL THERAPY: (see below for minutes): Joint mobilization and Soft tissue mobilization was utilized and necessary because of the patient's restricted joint motion, painful spasm, loss of articular motion and restricted motion of soft tissue. MODALITIES: (see below for minutes):      to decrease pain    SELF CARE: (see below for minutes): Procedure(s) (per grid) utilized to improve and/or restore self-care/home management as related to dressing, bathing and grooming. Required minimal verbal cueing to facilitate activities of daily living skills and compensatory activities.      Date: 2/17/22 2/22/22 (visit 2) 2/24/22 (visit 3) 2/28/22 (visit 4)    Modalities:   10 min  10 min        Cold whirlpool for pain and swelling repeat Therapeutic Exercise: 25 min 40 min  45 min  45 min     Active warm up Knee hug, lunge x 1 lap Knee hug, SLS to heel raise with OH med ball press x1 lap each Knee hug, SLS to heel raise with OH med ball press x 1 lap each repeat    Incline board 1 min knee straight, bent each 1 min knee straight and bent each repeat 1 min knee straight and bent each    Tband In/DF/Ev 3x15 each (green); seated toe flexion green 40x Seated toe press green 50x Lateral band walk at forefoot grey x 3 laps     Toe raise 3x15 leaning on wall 3x20 leaning on wall Shuffle walk x 1 lap Shuffle walk x 1 lap    bike  5 min       MOBO  Static stance 2x1 min, with med/lat reach 1 min  repeat Static stance x 2 min, with med/lat reach 3x7    SLDL   With KB pass through 15# [id number]      Heel raise  Standing 15x double, 3x8 SL; seated 100# 4x8 Standing 4x10 SL L1; seated 125# x10, 150# x10, 175# 2x10 Standing 3x15 L1; seated 175# 4x10    RFESS   3x10 with heel raise at bottom             Proprioceptive Activities:                                Manual Therapy:  5 min  5 min  5 min       MWM into closed chain DF; STM to medial incision repeat repeat            Therapeutic Activities:                                  HEP: see 2/17/22 flow sheet for specifics. (calf stretch at wall knees straight and bent 1 min ea), SLS with EC [id number], toe raise 3x15    [facility]  Treatment/Session Summary:    · Response to Treatment: Toe walking remains mildly irritable, limiting further progression with gait drills. · Communication/Consultation:  None today  · Equipment provided today:  None today  · Recommendations/Intent for next treatment session: Next visit will focus on progression of strength and return to prior level of function.     Total Treatment Billable Duration:  55 minutes  PT Patient Time In/Time Out  Time In: 1615  Time Out: [address], DPT    Future Appointments   Date Time Provider Dulce Maria Shafer   3/2/2022  2:45 PM Talha Rosa, PT [facility]   3/9/2022  7:00 PM Talha Rosa, PT [facility]   3/15/2022  4:15 PM Talha Rosa, PT [facility]   3/17/2022  2:00 PM [facility]   3/22/2022  4:15 PM [facility]   3/24/2022  2:00 PM [facility]   3/29/2022  4:15 PM Talha Rosa, PT [facility]   3/31/2022  2:00 PM Ophelia Riley, PT [facility]

## 2022-03-02 ENCOUNTER — HOSPITAL ENCOUNTER (OUTPATIENT)
Dept: PHYSICAL THERAPY | Age: 22
Discharge: HOME | End: 2022-03-02
Payer: COMMERCIAL

## 2022-03-02 PROCEDURE — 97022 WHIRLPOOL THERAPY: CPT

## 2022-03-02 PROCEDURE — 97110 THERAPEUTIC EXERCISES: CPT

## 2022-03-02 NOTE — PROGRESS NOTES
Juanita Barclay  : 2000  Primary: [address]*  Secondary: [place] [address]  [phone]   [phone]      OUTPATIENT PHYSICAL THERAPY: Daily Treatment Note  3/2/2022    ICD-10: Treatment Diagnosis: Pain in right foot (M79.671) and Stiffness of right foot, not elsewhere classified (M25.674) and Difficulty in walking, not elsewhere classified (R26.2)  Effective Dates: 2022 TO 2022 (90 days). Frequency/Duration: 3 times a week for 90 Days  GOALS: (Goals have been discussed and agreed upon with patient.)  Short-Term Functional Goals: Time Frame: 2 weeks  1. Patient will be independent with HEP. 2. Patient will restore ankle mobility to symmetric with uninvolved as measured by weight-bearing lunge test to normalize gait on uneven surfaces. 3. Patient will report pain <2/10 along incision sites to decrease sensitivity. Discharge Goals: Time Frame: 4 weeks  1. Patient will be able to perform 30 SL heel raises to normalize capacity of the calf for return to prior level of function. 2. Patient will demonstrate symmetric SL hop and stick to normalize dynamic stability of the limb. 3. Patient will have LEFS of 80/80 to return to prior level of function. _________________________________________________________________________  Pre-treatment Symptoms/Complaints:  No pain today - wants to know if he can start jogging. Pain: Initial: 2/10    Hardware removal on: 22 Post Session:  No increase/10   Medications Last Reviewed:  3/2/2022  Updated Objective Findings:  Below measures from initial evaluation unless otherwise noted. Observation/Orthostatic Postural Assessment:    Incision sites are well healed. Ambulates with mild decreased ankle rocker during right stance.    Girth: 40.5cm R, 44cm L at calf; 29.5cm R, 28.5cm L at midfoot; figure 8 is 58cm B  Palpation:    Mild tenderness along incision sites  ROM:    WBLT: 30 ° R, 35 ° L  Soleus length is 10 ° in prone  Inversion, eversion, and plantarflexion are WNL  Great toe extension is 60 ° B   Hip extension and quad length are restricted  Strength: Ankle DF: 4/5 R; 5/5 L  Inversion: 4/5 R with pain; 5/5 L  Eversion: 5/5 B  Plantarflexion 3/5 R with pain; 5/5 L  Special Tests:    Not applicable  Neurological Screen:  Grossly intact  Functional Mobility:   WNL  Balance:    Maintains single leg stance 5sec on R; 10+ on L  LEFS: 64/80     TREATMENT:   THERAPEUTIC ACTIVITY: ( see below for minutes): Therapeutic activities per grid below to improve mobility, strength, balance and coordination. Required minimal visual, verbal, manual and tactile cues to improve independence and safety with daily activities . THERAPEUTIC EXERCISE: (see below for minutes):  Exercises per grid below to improve mobility, strength, balance and coordination. Required minimal verbal and manual cues to promote proper body alignment, promote proper body posture and promote proper body mechanics. Progressed resistance, range, repetitions and complexity of movement as indicated. MANUAL THERAPY: (see below for minutes): Joint mobilization and Soft tissue mobilization was utilized and necessary because of the patient's restricted joint motion, painful spasm, loss of articular motion and restricted motion of soft tissue. MODALITIES: (see below for minutes):      to decrease pain    SELF CARE: (see below for minutes): Procedure(s) (per grid) utilized to improve and/or restore self-care/home management as related to dressing, bathing and grooming. Required minimal verbal cueing to facilitate activities of daily living skills and compensatory activities.      Date: 2/17/22 2/22/22 (visit 2) 2/24/22 (visit 3) 2/28/22 (visit 4) 3/2/22 (visit 5)   Modalities:   10 min  10 min  10 min      Cold whirlpool for pain and swelling repeat Repeat Therapeutic Exercise: 25 min 40 min  45 min  45 min  34 min   Active warm up Knee hug, lunge x 1 lap Knee hug, SLS to heel raise with OH med ball press x1 lap each Knee hug, SLS to heel raise with OH med ball press x 1 lap each repeat DYnadisc cw/ccw circles dl standing 2 x 15 each, R single leg ap and side to side tips 2 x 15 each   Incline board 1 min knee straight, bent each 1 min knee straight and bent each repeat 1 min knee straight and bent each Repeat   Tband In/DF/Ev 3x15 each (green); seated toe flexion green 40x Seated toe press green 50x Lateral band walk at forefoot grey x 3 laps     Toe raise 3x15 leaning on wall 3x20 leaning on wall Shuffle walk x 1 lap Shuffle walk x 1 lap Shuffle walk w/ toes dorsiflexed - 1 lap   bike  5 min       MOBO  Static stance 2x1 min, with med/lat reach 1 min  repeat Static stance x 2 min, with med/lat reach 3x7 Static stance 1 min knee ext, 1 min knee flex, two way reach 2 x 10    SLDL   With KB pass through 15# [id number]      Heel raise  Standing 15x double, 3x8 SL; seated 100# 4x8 Standing 4x10 SL L1; seated 125# x10, 150# x10, 175# 2x10 Standing 3x15 L1; seated 175# 4x10 Standing: L/R single leg 12, 15, 15, standing off half roll 3 x 12 L/R, seated 4 xx 15 @ 175   RFESS   3x10 with heel raise at bottom          Medball 9# march w/ calf raise x 50, march w/ calf raise, sl rdl x 40'        Treadmill jog x 3 min. Soleus stretch w/ band ant ankle for TC post glide. 2 min. Proprioceptive Activities:                                Manual Therapy:  5 min  5 min  5 min  5 min     MWM into closed chain DF; STM to medial incision repeat repeat Metatarsal glides: 4-5 and 1 dorsal/plantar, talocrural distraction, post. Glides (all gr. 3)           Therapeutic Activities:                                  HEP: see 2/17/22 flow sheet for specifics.  (calf stretch at wall knees straight and bent 1 min ea), SLS with EC [id number], toe raise 3x15    [facility] Portal  Treatment/Session Summary:    · Response to Treatment:  No pain with increase in calf raises, toe walking or treadmill jogging x 3 min. OK to start short-duration jogging. · Communication/Consultation:  None today  · Equipment provided today:  None today  · Recommendations/Intent for next treatment session: Next visit will focus on progression of strength and return to prior level of function.     Total Treatment Billable Duration: 49 minutes  PT Patient Time In/Time Out  Time In: 3527  Time Out: [facility],     Future Appointments   Date Time Provider Dulce Maria Shafer   3/9/2022  7:00 PM Anushka Monique, PT [facility]   3/15/2022  4:15 PM Naif KELLOGG, PT [facility]   3/17/2022  2:00 PM Anushka Monique, PT [facility]   3/22/2022  4:15 PM Anushka Monique, PT [facility]   3/24/2022  2:00 PM Anushka Monique, PT [facility]   3/29/2022  4:15 PM Anushka oMnique, PT [facility]   3/31/2022  2:00 PM Torin Underwood, PT [facility]

## 2022-03-07 ENCOUNTER — APPOINTMENT (OUTPATIENT)
Dept: PHYSICAL THERAPY | Age: 22
End: 2022-03-07
Payer: COMMERCIAL

## 2022-03-08 ENCOUNTER — APPOINTMENT (OUTPATIENT)
Dept: PHYSICAL THERAPY | Age: 22
End: 2022-03-08
Payer: COMMERCIAL

## 2022-03-10 ENCOUNTER — APPOINTMENT (OUTPATIENT)
Dept: PHYSICAL THERAPY | Age: 22
End: 2022-03-10
Payer: COMMERCIAL

## 2022-03-15 ENCOUNTER — HOSPITAL ENCOUNTER (OUTPATIENT)
Dept: PHYSICAL THERAPY | Age: 22
Discharge: HOME | End: 2022-03-15
Payer: COMMERCIAL

## 2022-03-15 PROCEDURE — 97110 THERAPEUTIC EXERCISES: CPT

## 2022-03-15 PROCEDURE — 97016 VASOPNEUMATIC DEVICE THERAPY: CPT

## 2022-03-15 NOTE — PROGRESS NOTES
Margi Barclay  : 2000  Primary: [address] [facility]  Secondary: [place] [address] @ [address]  [phone]   [phone]      OUTPATIENT PHYSICAL THERAPY: Daily Treatment Note  3/15/2022    ICD-10: Treatment Diagnosis: Pain in right foot (M79.671) and Stiffness of right foot, not elsewhere classified (M25.674) and Difficulty in walking, not elsewhere classified (R26.2)  Effective Dates: 2022 TO 2022 (90 days). Frequency/Duration: 3 times a week for 90 Days  GOALS: (Goals have been discussed and agreed upon with patient.)  Short-Term Functional Goals: Time Frame: 2 weeks  1. Patient will be independent with HEP. 2. Patient will restore ankle mobility to symmetric with uninvolved as measured by weight-bearing lunge test to normalize gait on uneven surfaces. 3. Patient will report pain <2/10 along incision sites to decrease sensitivity. Discharge Goals: Time Frame: 4 weeks  1. Patient will be able to perform 30 SL heel raises to normalize capacity of the calf for return to prior level of function. 2. Patient will demonstrate symmetric SL hop and stick to normalize dynamic stability of the limb. 3. Patient will have LEFS of 80/80 to return to prior level of function. _________________________________________________________________________  Pre-treatment Symptoms/Complaints: 1st toe paronychia developed since last treatment, toe is sensitive to contact. Foot otherwise progressing well. Pain: Initial: 2/10    Hardware removal on: 22 Post Session:  No increase/10   Medications Last Reviewed:  3/15/2022  Updated Objective Findings:  Below measures from initial evaluation unless otherwise noted. Observation/Orthostatic Postural Assessment:    Incision sites are well healed. Ambulates with mild decreased ankle rocker during right stance.    Girth: 40.5cm R, 44cm L at calf; 29.5cm R, 28.5cm L at midfoot; figure 8 is 58cm B  Palpation:    Mild tenderness along incision sites  ROM:    WBLT: 30 ° R, 35 ° L  Soleus length is 10 ° in prone  Inversion, eversion, and plantarflexion are WNL  Great toe extension is 60 ° B   Hip extension and quad length are restricted  Strength: Ankle DF: 4/5 R; 5/5 L  Inversion: 4/5 R with pain; 5/5 L  Eversion: 5/5 B  Plantarflexion 3/5 R with pain; 5/5 L  Special Tests:    Not applicable  Neurological Screen:  Grossly intact  Functional Mobility:   WNL  Balance:    Maintains single leg stance 5sec on R; 10+ on L  LEFS: 64/80     TREATMENT:   THERAPEUTIC ACTIVITY: ( see below for minutes): Therapeutic activities per grid below to improve mobility, strength, balance and coordination. Required minimal visual, verbal, manual and tactile cues to improve independence and safety with daily activities . THERAPEUTIC EXERCISE: (see below for minutes):  Exercises per grid below to improve mobility, strength, balance and coordination. Required minimal verbal and manual cues to promote proper body alignment, promote proper body posture and promote proper body mechanics. Progressed resistance, range, repetitions and complexity of movement as indicated. MANUAL THERAPY: (see below for minutes): Joint mobilization and Soft tissue mobilization was utilized and necessary because of the patient's restricted joint motion, painful spasm, loss of articular motion and restricted motion of soft tissue. MODALITIES: (see below for minutes):      to decrease pain    SELF CARE: (see below for minutes): Procedure(s) (per grid) utilized to improve and/or restore self-care/home management as related to dressing, bathing and grooming. Required minimal verbal cueing to facilitate activities of daily living skills and compensatory activities.      Date: 2/17/22 2/22/22 (visit 2) 2/24/22 (visit 3) 2/28/22 (visit 4) 3/2/22 (visit 5) 3/15/22 (visit 6)    Modalities:   10 min 10 min  10 min 15 min      Cold whirlpool for pain and swelling repeat Repeat Gameready ankle foot R x 15 min, 34 deg. Therapeutic Exercise: 25 min 40 min  45 min  45 min  34 min 33 min   Active warm up Knee hug, lunge x 1 lap Knee hug, SLS to heel raise with OH med ball press x1 lap each Knee hug, SLS to heel raise with OH med ball press x 1 lap each repeat DYnadisc cw/ccw circles dl standing 2 x 15 each, R single leg ap and side to side tips 2 x 15 each Ankle 3 way resisted: D/i/E; silver tband 3 x 15 each   Incline board 1 min knee straight, bent each 1 min knee straight and bent each repeat 1 min knee straight and bent each Repeat 2 min knee straight   Tband In/DF/Ev 3x15 each (green); seated toe flexion green 40x Seated toe press green 50x Lateral band walk at forefoot grey x 3 laps      Toe raise 3x15 leaning on wall 3x20 leaning on wall Shuffle walk x 1 lap Shuffle walk x 1 lap Shuffle walk w/ toes dorsiflexed - 1 lap    bike  5 min        MOBO  Static stance 2x1 min, with med/lat reach 1 min  repeat Static stance x 2 min, with med/lat reach 3x7 Static stance 1 min knee ext, 1 min knee flex, two way reach 2 x 10  Wobbleboard: standing DL: ap tipping x 2 min, level board balance x 1 min, cw/ccw circles x 1 min   SLDL   With KB pass through 15# [id number]       Heel raise  Standing 15x double, 3x8 SL; seated 100# 4x8 Standing 4x10 SL L1; seated 125# x10, 150# x10, 175# 2x10 Standing 3x15 L1; seated 175# 4x10 Standing: L/R single leg 12, 15, 15, standing off half roll 3 x 12 L/R, seated 4 xx 15 @ 175 DL calf raises (foot on half roll): x 30 , single leg calf 3 x 10 R   RFESS   3x10 with heel raise at bottom           Medball 9# march w/ calf raise x 50, march w/ calf raise, sl rdl x 40'         Treadmill jog x 3 min. Soleus stretch w/ band ant ankle for TC post glide. 2 min. R dorsiflexion rocker stretch w/ manl.  Guidance: x 8 min                        Proprioceptive Activities: Manual Therapy:  5 min  5 min  5 min  5 min 6 min     MWM into closed chain DF; STM to medial incision repeat repeat Metatarsal glides: 4-5 and 1 dorsal/plantar, talocrural distraction, post. Glides (all gr. 3) 1st met plantar glide gr. 3, talocrural posterior glides in weightbearing gr. 3-4, fib post glides gr. 4 w/ rocker stretch            Therapeutic Activities:                                      HEP: see 2/17/22 flow sheet for specifics. (calf stretch at wall knees straight and bent 1 min ea), SLS with EC [id number], toe raise 3x15    Med[employer] Portal  Treatment/Session Summary:    · Response to Treatment:  Treatment today limited by 1st toe paronychia. Dorsiflexion rocker reproduced anterolatera ankle pain, but this responded well to talocrural and fibular mobs. · Communication/Consultation:  None today  · Equipment provided today:  None today  · Recommendations/Intent for next treatment session: Next visit will focus on progression of strength and return to prior level of function.     Total Treatment Billable Duration: 49 minutes  PT Patient Time In/Time Out  Time In: 5243  Time Out: 43 [facility]    Future Appointments   Date Time Provider Dulce Maria Shafer   3/17/2022  2:00 PM Emily Ee, PT [facility]   3/22/2022  4:15 PM Emily Ee, PT JOSE ROBERTO [facility]   3/24/2022  2:00 PM Emily Ee, PT [facility]   3/29/2022  4:15 PM Emily Ee, PT JOSE ARMANDOOFORI [facility]   3/31/2022  2:00 PM Kurfman, Garen Hodgkin, PT [facility]

## 2022-03-16 ENCOUNTER — APPOINTMENT (OUTPATIENT)
Dept: PHYSICAL THERAPY | Age: 22
End: 2022-03-16
Payer: COMMERCIAL

## 2022-03-17 ENCOUNTER — APPOINTMENT (OUTPATIENT)
Dept: PHYSICAL THERAPY | Age: 22
End: 2022-03-17
Payer: COMMERCIAL

## 2022-03-17 ENCOUNTER — HOSPITAL ENCOUNTER (OUTPATIENT)
Dept: PHYSICAL THERAPY | Age: 22
Discharge: HOME | End: 2022-03-17
Payer: COMMERCIAL

## 2022-03-17 PROCEDURE — 97140 MANUAL THERAPY 1/> REGIONS: CPT

## 2022-03-17 PROCEDURE — 97110 THERAPEUTIC EXERCISES: CPT

## 2022-03-17 NOTE — PROGRESS NOTES
Dee Barclay  : 2000  Primary: [address] [facility]  Secondary: [place] [address] @ [address]  [phone]   [phone]      OUTPATIENT PHYSICAL THERAPY: Daily Treatment Note  3/17/2022    ICD-10: Treatment Diagnosis: Pain in right foot (M79.671) and Stiffness of right foot, not elsewhere classified (M25.674) and Difficulty in walking, not elsewhere classified (R26.2)  Effective Dates: 2022 TO 2022 (90 days). Frequency/Duration: 3 times a week for 90 Days  GOALS: (Goals have been discussed and agreed upon with patient.)  Short-Term Functional Goals: Time Frame: 2 weeks  1. Patient will be independent with HEP. 2. Patient will restore ankle mobility to symmetric with uninvolved as measured by weight-bearing lunge test to normalize gait on uneven surfaces. 3. Patient will report pain <2/10 along incision sites to decrease sensitivity. Discharge Goals: Time Frame: 4 weeks  1. Patient will be able to perform 30 SL heel raises to normalize capacity of the calf for return to prior level of function. 2. Patient will demonstrate symmetric SL hop and stick to normalize dynamic stability of the limb. 3. Patient will have LEFS of 80/80 to return to prior level of function. _________________________________________________________________________  Pre-treatment Symptoms/Complaints: Toe infection resolving. Experiencing mild anterior ankle pinching during calf stretching. Pain: Initial: 2/10    Hardware removal on: 22 Post Session:  No increase/10   Medications Last Reviewed:  3/17/2022  Updated Objective Findings:  Below measures from initial evaluation unless otherwise noted. Observation/Orthostatic Postural Assessment:    Incision sites are well healed. Ambulates with mild decreased ankle rocker during right stance.    Girth: 40.5cm R, 44cm L at calf; 29.5cm R, 28.5cm L at midfoot; figure 8 is 58cm B  Palpation:    Mild tenderness along incision sites  ROM:    WBLT: 30 ° R, 35 ° L  Soleus length is 10 ° in prone  Inversion, eversion, and plantarflexion are WNL  Great toe extension is 60 ° B   Hip extension and quad length are restricted  Strength: Ankle DF: 4/5 R; 5/5 L  Inversion: 4/5 R with pain; 5/5 L  Eversion: 5/5 B  Plantarflexion 3/5 R with pain; 5/5 L  Special Tests:    Not applicable  Neurological Screen:  Grossly intact  Functional Mobility:   WNL  Balance:    Maintains single leg stance 5sec on R; 10+ on L  LEFS: 64/80  3/17/22: dorsiflexion rocker = 30 deg pre-mob/taping, 35 deg after. TREATMENT:   THERAPEUTIC ACTIVITY: ( see below for minutes): Therapeutic activities per grid below to improve mobility, strength, balance and coordination. Required minimal visual, verbal, manual and tactile cues to improve independence and safety with daily activities . THERAPEUTIC EXERCISE: (see below for minutes):  Exercises per grid below to improve mobility, strength, balance and coordination. Required minimal verbal and manual cues to promote proper body alignment, promote proper body posture and promote proper body mechanics. Progressed resistance, range, repetitions and complexity of movement as indicated. MANUAL THERAPY: (see below for minutes): Joint mobilization and Soft tissue mobilization was utilized and necessary because of the patient's restricted joint motion, painful spasm, loss of articular motion and restricted motion of soft tissue. MODALITIES: (see below for minutes):      to decrease pain    SELF CARE: (see below for minutes): Procedure(s) (per grid) utilized to improve and/or restore self-care/home management as related to dressing, bathing and grooming. Required minimal verbal cueing to facilitate activities of daily living skills and compensatory activities.      Date: 3/17/22 (visit 7)       Modalities: Therapeutic Exercise: 37 min        Soleus stretch (standing lunge) 2 min x 2        Walking lunges (heel down for calf stretch): 35' x 4        SL calf raises w/ heel off slantboard: 3 x 12        Toe walk w/ 20# carry: 4 x 35'         Wobble board: CW/CCW circles dl standing x 2 min         BOSU: 4 way tipping DL  X 10 ( #3)        Static hold balance Moboboard 2/4        12\" - 24\" hopping L pattern: DL 6 forw/bkwd and side - side 3 each        12\" 24\" sl hopping; 3 each forw, bkwd, L, R . SL 6 each L / R side hops        Slantboardstretching gastroc/soleus, 3 min. Fibular post glide spiral taping for ankle mobility (worn during treatment today)               Proprioceptive Activities:                                Manual Therapy: 8 min        Joint mobs: Talocrural distraction gr. 3-4-5, distal fib post glides gr. 3-4, med/latl colum glides gr. 3               Therapeutic Activities:                                  HEP: see 2/17/22 flow sheet for specifics. (calf stretch at wall knees straight and bent 1 min ea), SLS with EC [id number], toe raise 3x15    Med[facility] Portal  Treatment/Session Summary:    · Response to Treatment:  Hopping activities resumed - strong progress, with only mild coordination issues during single leg hopping. · Communication/Consultation:  None today  · Equipment provided today:  None today  Recommendations/Intent for next treatment session: Next visit will focus on progression of strength and return to prior level of function. Agility ladder and side shuffle activities.      Total Treatment Billable Duration: 45 minutes  PT Patient Time In/Time Out  Time In: 1400  Time Out: 119 Heuvkerry Lujan, PT    Future Appointments   Date Time Provider Dulce Maria Shafer   3/22/2022  4:15 PM Phillip Beauchamp PT [facility]   3/24/2022  2:00 PM Phillip Beauchamp PT [facility]   3/29/2022  4:15 PM LALO GallegosOFORI [facility]   3/31/2022  2:00 PM [facility]   4/6/2022  2:45 PM [facility]   4/8/2022  2:45 PM Amina Glaser, PT [facility]   4/11/2022 10:15 AM Amina Glaser, PT [facility]   4/13/2022  2:45 PM [facility]   4/20/2022  2:45 PM [facility]   4/22/2022  2:45 PM [facility]   4/27/2022  2:45 PM [facility] Glaser, [facility]   4/29/2022  2:45 PM Ever Licea, Kike Martinez, PT [facility]

## 2022-03-22 ENCOUNTER — HOSPITAL ENCOUNTER (OUTPATIENT)
Dept: PHYSICAL THERAPY | Age: 22
Discharge: HOME | End: 2022-03-22
Payer: COMMERCIAL

## 2022-03-22 PROCEDURE — 97110 THERAPEUTIC EXERCISES: CPT

## 2022-03-22 PROCEDURE — 97140 MANUAL THERAPY 1/> REGIONS: CPT

## 2022-03-22 PROCEDURE — 97016 VASOPNEUMATIC DEVICE THERAPY: CPT

## 2022-03-22 NOTE — PROGRESS NOTES
Mac Cartagena [place]  : 2000  Primary: [address] [facility]  [address]: [place] [address]  [phone]   [phone]      OUTPATIENT PHYSICAL THERAPY: Progress Report/ Daily Treatment Note  3/22/2022    ICD-10: Treatment Diagnosis: Pain in right foot (M79.671) and Stiffness of right foot, not elsewhere classified (M25.674) and Difficulty in walking, not elsewhere classified (R26.2)  Effective Dates: 2022 TO 2022 (90 days). Frequency/Duration: 3 times a week for 90 Days  GOALS: (Goals have been discussed and agreed upon with patient.)  Short-Term Functional Goals: Time Frame: 2 weeks  1. Patient will be independent with HEP. (MET)  2. Patient will restore ankle mobility to symmetric with uninvolved as measured by weight-bearing lunge test to normalize gait on uneven surfaces.(MET)   3. Patient will report pain <2/10 along incision sites to decrease sensitivity. (MET)  Discharge Goals: Time Frame: 4 weeks  1. Patient will be able to perform 30 SL heel raises to normalize capacity of the calf for return to prior level of function. 2. Patient will demonstrate symmetric SL hop and stick to normalize dynamic stability of the limb. 3. Patient will have LEFS of 80/80 to return to prior level of function. _________________________________________________________________________  Pre-treatment Symptoms/Complaints: Pt reporting resolved ankle joint pinch sensation. Notes experiencing lateral midfoot pain during running activities. Pain: Initial: 2/10    Hardware removal on: 22 Post Session:  No increase/10   Medications Last Reviewed:  3/22/2022  Updated Objective Findings:  Below measures from initial evaluation unless otherwise noted. Observation/Orthostatic Postural Assessment:    Incision sites are well healed.  Ambulates with mild decreased ankle rocker during right stance. Girth: 40.5cm R, 44cm L at calf; 29.5cm R, 28.5cm L at midfoot; figure 8 is 58cm B  Palpation:    Mild tenderness along incision sites  ROM:    WBLT: 30 ° R, 35 ° L  Soleus length is 10 ° in prone  Inversion, eversion, and plantarflexion are WNL  Great toe extension is 60 ° B   Hip extension and quad length are restricted  Strength: Ankle DF: 4/5 R; 5/5 L  Inversion: 4/5 R with pain; 5/5 L  Eversion: 5/5 B  Plantarflexion 3/5 R with pain; 5/5 L  Special Tests:    Not applicable  Neurological Screen:  Grossly intact  Functional Mobility:   WNL  Balance:    Maintains single leg stance 5sec on R; 10+ on L  LEFS: 64/80  3/17/22: dorsiflexion rocker = 30 deg pre-mob/taping, 35 deg after. 3/22/22: Dorsiflexion rocker = 38 deg after stretch/mob. Single leg calf raise endurance: 20 reps while off step holding 20# L/R, with fatigue/lower excursions on R during last few reps. TREATMENT:   THERAPEUTIC ACTIVITY: ( see below for minutes): Therapeutic activities per grid below to improve mobility, strength, balance and coordination. Required minimal visual, verbal, manual and tactile cues to improve independence and safety with daily activities . THERAPEUTIC EXERCISE: (see below for minutes):  Exercises per grid below to improve mobility, strength, balance and coordination. Required minimal verbal and manual cues to promote proper body alignment, promote proper body posture and promote proper body mechanics. Progressed resistance, range, repetitions and complexity of movement as indicated. MANUAL THERAPY: (see below for minutes): Joint mobilization and Soft tissue mobilization was utilized and necessary because of the patient's restricted joint motion, painful spasm, loss of articular motion and restricted motion of soft tissue.    MODALITIES: (see below for minutes):      to decrease pain    SELF CARE: (see below for minutes): Procedure(s) (per grid) utilized to improve and/or restore self-care/home management as related to dressing, bathing and grooming. Required minimal verbal cueing to facilitate activities of daily living skills and compensatory activities. Date: 3/17/22 (visit 7) 3/22/22 (visit 8/PN)      Modalities:  15 min        Gameready vaso/cry R foot/ankle x 15 min                      Therapeutic Exercise: 37 min 35 min       Soleus stretch (standing lunge) 2 min x 2 Standing dynadisc; dl cw/ccw circles 3 x 15, sl 4 way tipping L/R 10 reps       Walking lunges (heel down for calf stretch): 35' x 4 Single leg calf raises: off step w/ 20#: 20 reps each       SL calf raises w/ heel off slantboard: 3 x 12 R soleus stretch on slant board: 2 min       Toe walk w/ 20# carry: 4 x 35'  Double dynadisc balance w/ throw /catch: 2 min       Wobble board: CW/CCW circles dl standing x 2 min  DL BOSU balance w/ throw/catch: 2 min       BOSU: 4 way tipping DL  X 10 ( #3) DL wicket hops : 4 x 4 for height, 5 x 4 for speed       Static hold balance Moboboard 2/4 Agility ladder drills: lateral shuffles L/R x 5, Icky shuffles x 4       12\" - 24\" hopping L pattern: DL 6 forw/bkwd and side - side 3 each Single leg hops L pattern 24 to 12\" x 8 sets of 9 reps L/R.        12\" 24\" sl hopping; 3 each forw, bkwd, L, R . SL 6 each L / R side hops        Slantboardstretching gastroc/soleus, 3 min. Fibular post glide spiral taping for ankle mobility (worn during treatment today)               Proprioceptive Activities:                                Manual Therapy: 8 min 8 min       Joint mobs: Talocrural distraction gr. 3-4-5, distal fib post glides gr. 3-4, med/latl colum glides gr. 3 Joint mobs: TC distraction, midtarsal glides, cuboid dorsal glide, distal fib post glides. (all gr. 3-4)              Therapeutic Activities:                                  HEP: see 2/17/22 flow sheet for specifics.  (calf stretch at wall knees straight and bent 1 min ea), SLS with EC [id number], toe raise 3x15    [facility] Portal  Treatment/Session Summary:    · Response to Treatment:  STG's met. New issue = dorsal cuboid pain - reproduced with motion, not impact, with minimal pain w/ palpation - recommended to training staff they consider ankle taping for closer support during training sessions. · Communication/Consultation:  None today  · Equipment provided today:  None today  Recommendations/Intent for next treatment session: Next visit will focus on progression of strength and return to prior level of function. Agility ladder and side shuffle activities.      Total Treatment Billable Duration: 58 minutes  PT Patient Time In/Time Out  Time In: 8930  Time Out: 43 [facility], PT    Future Appointments   Date Time Provider Dulce Maria Shafer   3/24/2022  2:00 PM [facility]   3/29/2022  4:15 PM Michaela KELLOGG, PT [facility]   3/31/2022  2:00 PM Susan Boozer, PT [facility]   4/6/2022  2:45 PM Susan Boozer, PT [facility]   4/8/2022  2:45 PM Susan Boozer, PT [facility]   4/11/2022 10:15 AM Susan Boozer, PT [facility]   4/13/2022  2:45 PM Susan Boozer, PT [facility]   4/20/2022  2:45 PM Susan Boozer, PT [facility]   4/22/2022  2:45 PM Susan Boozer, PT [facility]   4/27/2022  2:45 PM Susan Boozer, PT [facility]   4/29/2022  2:45 PM Gina Underwood, PT [facility]

## 2022-03-24 ENCOUNTER — HOSPITAL ENCOUNTER (OUTPATIENT)
Dept: PHYSICAL THERAPY | Age: 22
Discharge: HOME | End: 2022-03-24
Payer: COMMERCIAL

## 2022-03-24 PROCEDURE — 97016 VASOPNEUMATIC DEVICE THERAPY: CPT

## 2022-03-24 PROCEDURE — 97110 THERAPEUTIC EXERCISES: CPT

## 2022-03-24 PROCEDURE — 97140 MANUAL THERAPY 1/> REGIONS: CPT

## 2022-03-29 ENCOUNTER — HOSPITAL ENCOUNTER (OUTPATIENT)
Dept: PHYSICAL THERAPY | Age: 22
Discharge: HOME | End: 2022-03-29
Payer: COMMERCIAL

## 2022-03-29 PROCEDURE — 97140 MANUAL THERAPY 1/> REGIONS: CPT

## 2022-03-29 PROCEDURE — 97110 THERAPEUTIC EXERCISES: CPT

## 2022-03-29 PROCEDURE — 97016 VASOPNEUMATIC DEVICE THERAPY: CPT

## 2022-03-29 NOTE — PROGRESS NOTES
Ed Barclay  : 2000  Primary: [address] [facility]*  Secondary: [place] [address] @ [address], [address] [place] [address]  [phone]   [phone]      OUTPATIENT PHYSICAL THERAPY:  Daily Treatment Note  3/29/2022    ICD-10: Treatment Diagnosis: Pain in right foot (M79.671) and Stiffness of right foot, not elsewhere classified (M25.674) and Difficulty in walking, not elsewhere classified (R26.2)  Effective Dates: 2022 TO 2022 (90 days). Frequency/Duration: 3 times a week for 90 Days  GOALS: (Goals have been discussed and agreed upon with patient.)  Short-Term Functional Goals: Time Frame: 2 weeks  1. Patient will be independent with HEP. (MET)  2. Patient will restore ankle mobility to symmetric with uninvolved as measured by weight-bearing lunge test to normalize gait on uneven surfaces.(MET)   3. Patient will report pain <2/10 along incision sites to decrease sensitivity. (MET)  Discharge Goals: Time Frame: 4 weeks  1. Patient will be able to perform 30 SL heel raises to normalize capacity of the calf for return to prior level of function. 2. Patient will demonstrate symmetric SL hop and stick to normalize dynamic stability of the limb. 3. Patient will have LEFS of 80/80 to return to prior level of function. _________________________________________________________________________  Pre-treatment Symptoms/Complaints: Only pain now is dorsolateral foot pain when he runs and calf raises on incline - he feels that this is just muscular weakness at this point. Pain: Initial: 2/10    Hardware removal on: 22 Post Session:  No increase/10   Medications Last Reviewed:  3/29/2022  Updated Objective Findings:  Below measures from initial evaluation unless otherwise noted. Observation/Orthostatic Postural Assessment:    Incision sites are well healed.  Ambulates with mild decreased ankle rocker during right stance. Girth: 40.5cm R, 44cm L at calf; 29.5cm R, 28.5cm L at midfoot; figure 8 is 58cm B  Palpation:    Mild tenderness along incision sites  ROM:    WBLT: 30 ° R, 35 ° L  Soleus length is 10 ° in prone  Inversion, eversion, and plantarflexion are WNL  Great toe extension is 60 ° B   Hip extension and quad length are restricted  Strength: Ankle DF: 4/5 R; 5/5 L  Inversion: 4/5 R with pain; 5/5 L  Eversion: 5/5 B  Plantarflexion 3/5 R with pain; 5/5 L  Special Tests:    Not applicable  Neurological Screen:  Grossly intact  Functional Mobility:   WNL  Balance:    Maintains single leg stance 5sec on R; 10+ on L  LEFS: 64/80  3/17/22: dorsiflexion rocker = 30 deg pre-mob/taping, 35 deg after. 3/22/22: Dorsiflexion rocker = 38 deg after stretch/mob. Single leg calf raise endurance: 20 reps while off step holding 20# L/R, with fatigue/lower excursions on R during last few reps. TREATMENT:   THERAPEUTIC ACTIVITY: ( see below for minutes): Therapeutic activities per grid below to improve mobility, strength, balance and coordination. Required minimal visual, verbal, manual and tactile cues to improve independence and safety with daily activities . THERAPEUTIC EXERCISE: (see below for minutes):  Exercises per grid below to improve mobility, strength, balance and coordination. Required minimal verbal and manual cues to promote proper body alignment, promote proper body posture and promote proper body mechanics. Progressed resistance, range, repetitions and complexity of movement as indicated. MANUAL THERAPY: (see below for minutes): Joint mobilization and Soft tissue mobilization was utilized and necessary because of the patient's restricted joint motion, painful spasm, loss of articular motion and restricted motion of soft tissue.    MODALITIES: (see below for minutes):      to decrease pain    SELF CARE: (see below for minutes): Procedure(s) (per grid) utilized to improve and/or restore self-care/home management as related to dressing, bathing and grooming. Required minimal verbal cueing to facilitate activities of daily living skills and compensatory activities. Date: 3/17/22 (visit 7) 3/22/22 (visit 8/PN) 3/24/22 (visit 9) 3/29/22 (visit 10)    Modalities:  15 min 15 min 15 min      Gameready vaso/cry R foot/ankle x 15 min Gameready vaso/cry R foot/ankle x 15 min Gameready vaso/cry R foot/ankle x 15 min                    Therapeutic Exercise: 37 min 35 min 36 min 35 min     Soleus stretch (standing lunge) 2 min x 2 Standing dynadisc; dl cw/ccw circles 3 x 15, sl 4 way tipping L/R 10 reps Standing wobbleboar dl cw/ccw circles 3 x 15, sl 4 way tipping L/R 2 x  10 reps Irregular surface forw/bkwd walking (very short steps) x 10 x 4' each     Walking lunges (heel down for calf stretch): 35' x 4 Single leg calf raises: off step w/ 20#: 20 reps each Single leg calf raise R : 30x  Dynadisc: standing DL cw/ccw circles x 2 min, sl 4 way tipping 4 x 10 reps each L/R.      SL calf raises w/ heel off slantboard: 3 x 12 R soleus stretch on slant board: 2 min Soleus stretch wt manual guidance: 8 x 10\"  Ankle 3 way resisted: 3 x 15 each D/I/E - gray tband     Toe walk w/ 20# carry: 4 x 35'  Double dynadisc balance w/ throw /catch: 2 min Toe walk : 2 x 50' Toe walk w/ carry 20#: 8 x 40'     Wobble board: CW/CCW circles dl standing x 2 min  DL BOSU balance w/ throw/catch: 2 min Single leg balance w/ rebounder throw/catch: forward x 10 , side x 10, single leg airex x 15 L/R.   Slantboard stretching: soleus x 1 min, gastroc 2 x 1 min     BOSU: 4 way tipping DL  X 10 ( #3) DL wicket hops : 4 x 4 for height, 5 x 4 for speed Single leg R slider lunges: 3 x 10  dynadisx single leg R static hold balance x 1  min     Static hold balance Moboboard 2/4 Agility ladder drills: lateral shuffles L/R x 5, Icky shuffles x 4 Single leg hops for distance: 4 x 7 hops each L/R Calf raises: 2 x 12 R off slantboard, 1 x 12 off step     12\" - 24\" hopping L pattern: DL 6 forw/bkwd and side - side 3 each Single leg hops L pattern 24 to 12\" x 8 sets of 9 reps L/R. Single leg lateral hops off/on 2\" airex pad: 4 reps (2 sets)      12\" 24\" sl hopping; 3 each forw, bkwd, L, R . SL 6 each L / R side hops  Slantboard stretch x 90 sec knee ext, 60 \" flexed       Slantboardstretching gastroc/soleus, 3 min. Fibular post glide spiral taping for ankle mobility (worn during treatment today)               Proprioceptive Activities:                                Manual Therapy: 8 min 8 min 9 min. 10  min     Joint mobs: Talocrural distraction gr. 3-4-5, distal fib post glides gr. 3-4, med/latl colum glides gr. 3 Joint mobs: TC distraction, midtarsal glides, cuboid dorsal glide, distal fib post glides. (all gr. 3-4) Joint mobs: TC distraction, midtarsal glides, distal fib post glides. (all gr. 3-4) Joint mobs: TC distraction, midtarsal glides,, cuboid dorsal glide all gr. 3, ankle circles assisted        IASTM dorsal lateral forefoot x 4 min    Therapeutic Activities:                                  HEP: see 2/17/22 flow sheet for specifics. (calf stretch at wall knees straight and bent 1 min ea), SLS with EC [id number], toe raise 3x15    [facility]  Treatment/Session Summary:    · Response to Treatment:  Proximal met/cuboid pain during calf raise and toe walk progressions, milder discomfort during dynadisc single leg training. · Communication/Consultation:  None today  · Equipment provided today:  None today  Recommendations/Intent for next treatment session: Next visit will focus on progression of strength and return to prior level of function. Single leg hop activities.       Total Treatment Billable Duration: 60 minutes  PT Patient Time In/Time Out  Time In: 2297  Time Out: [address], PT    Future Appointments   Date Time Provider Dulce Maria Shafer   3/31/2022  2:00 PM Cielo Underwood, PT [facility]   4/6/2022  2:45 PM Harriet Otoole, PT [facility]   4/8/2022  2:45 PM Harriet Otoole, PT [facility]   4/11/2022 10:15 AM Harriet Otoole, PT [facility]   4/13/2022  2:45 PM Harriet Otoole, PT [facility]   4/20/2022  2:45 PM Harriet Otoole, PT [facility]   4/22/2022  2:45 PM Harriet Otoole, PT [facility]   4/27/2022  2:45 PM Harriet Otoole, PT [facility]   4/29/2022  2:45 PM Sydni Marinelli, PT [facility]

## 2022-03-31 ENCOUNTER — HOSPITAL ENCOUNTER (OUTPATIENT)
Dept: PHYSICAL THERAPY | Age: 22
Discharge: HOME | End: 2022-03-31
Payer: COMMERCIAL

## 2022-03-31 PROCEDURE — 97016 VASOPNEUMATIC DEVICE THERAPY: CPT

## 2022-03-31 PROCEDURE — 97110 THERAPEUTIC EXERCISES: CPT

## 2022-03-31 PROCEDURE — 97140 MANUAL THERAPY 1/> REGIONS: CPT

## 2022-03-31 NOTE — PROGRESS NOTES
Jose Barclay  : 2000  Primary: [address] [facility]  Secondary: [place] [address] @ [address], [address] [place] [address]  [phone]   [phone]      OUTPATIENT PHYSICAL THERAPY:  Daily Treatment Note  3/31/2022    ICD-10: Treatment Diagnosis: Pain in right foot (M79.671) and Stiffness of right foot, not elsewhere classified (M25.674) and Difficulty in walking, not elsewhere classified (R26.2)  Effective Dates: 2022 TO 2022 (90 days). Frequency/Duration: 3 times a week for 90 Days  GOALS: (Goals have been discussed and agreed upon with patient.)  Short-Term Functional Goals: Time Frame: 2 weeks  1. Patient will be independent with HEP. (MET)  2. Patient will restore ankle mobility to symmetric with uninvolved as measured by weight-bearing lunge test to normalize gait on uneven surfaces.(MET)   3. Patient will report pain <2/10 along incision sites to decrease sensitivity. (MET)  Discharge Goals: Time Frame: 4 weeks  1. Patient will be able to perform 30 SL heel raises to normalize capacity of the calf for return to prior level of function. 2. Patient will demonstrate symmetric SL hop and stick to normalize dynamic stability of the limb. 3. Patient will have LEFS of 80/80 to return to prior level of function. _________________________________________________________________________  Pre-treatment Symptoms/Complaints: Fatigued today, weight training for upper body today. Not much sleep due to schoolwork yesterday. Lateral foot pain still present, was able to run at slower speeds at practice but not faster speeds. Pain: Initial: 2/10    Hardware removal on: 22 Post Session:  No increase/10   Medications Last Reviewed:  3/31/2022  Updated Objective Findings:  Below measures from initial evaluation unless otherwise noted.      Observation/Orthostatic Postural Assessment:    Incision sites are well healed. Ambulates with mild decreased ankle rocker during right stance. Girth: 40.5cm R, 44cm L at calf; 29.5cm R, 28.5cm L at midfoot; figure 8 is 58cm B  Palpation:    Mild tenderness along incision sites  ROM:    WBLT: 30 ° R, 35 ° L  Soleus length is 10 ° in prone  Inversion, eversion, and plantarflexion are WNL  Great toe extension is 60 ° B   Hip extension and quad length are restricted  Strength: Ankle DF: 4/5 R; 5/5 L  Inversion: 4/5 R with pain; 5/5 L  Eversion: 5/5 B  Plantarflexion 3/5 R with pain; 5/5 L  Special Tests:    Not applicable  Neurological Screen:  Grossly intact  Functional Mobility:   WNL  Balance:    Maintains single leg stance 5sec on R; 10+ on L  LEFS: 64/80  3/17/22: dorsiflexion rocker = 30 deg pre-mob/taping, 35 deg after. 3/22/22: Dorsiflexion rocker = 38 deg after stretch/mob. Single leg calf raise endurance: 20 reps while off step holding 20# L/R, with fatigue/lower excursions on R during last few reps. TREATMENT:   THERAPEUTIC ACTIVITY: ( see below for minutes): Therapeutic activities per grid below to improve mobility, strength, balance and coordination. Required minimal visual, verbal, manual and tactile cues to improve independence and safety with daily activities . THERAPEUTIC EXERCISE: (see below for minutes):  Exercises per grid below to improve mobility, strength, balance and coordination. Required minimal verbal and manual cues to promote proper body alignment, promote proper body posture and promote proper body mechanics. Progressed resistance, range, repetitions and complexity of movement as indicated. MANUAL THERAPY: (see below for minutes): Joint mobilization and Soft tissue mobilization was utilized and necessary because of the patient's restricted joint motion, painful spasm, loss of articular motion and restricted motion of soft tissue.    MODALITIES: (see below for minutes):      to decrease pain    SELF CARE: (see below for minutes): Procedure(s) (per grid) utilized to improve and/or restore self-care/home management as related to dressing, bathing and grooming. Required minimal verbal cueing to facilitate activities of daily living skills and compensatory activities. Date: 3/17/22 (visit 7) 3/22/22 (visit 8/PN) 3/24/22 (visit 9) 3/29/22 (visit 10) 3/31/22 (visit 11)     Modalities:  15 min 15 min 15 min 15 min     Gameready vaso/cry R foot/ankle x 15 min Gameready vaso/cry R foot/ankle x 15 min Gameready vaso/cry R foot/ankle x 15 min Gameready vaso/cry R foot/ankle x 15 min                   Therapeutic Exercise: 37 min 35 min 36 min 35 min 22 min    Soleus stretch (standing lunge) 2 min x 2 Standing dynadisc; dl cw/ccw circles 3 x 15, sl 4 way tipping L/R 10 reps Standing wobbleboar dl cw/ccw circles 3 x 15, sl 4 way tipping L/R 2 x  10 reps Irregular surface forw/bkwd walking (very short steps) x 10 x 4' each Dynadisc : standing DL cw / ccw circles : 30 each. Walking lunges (heel down for calf stretch): 35' x 4 Single leg calf raises: off step w/ 20#: 20 reps each Single leg calf raise R : 30x  Dynadisc: standing DL cw/ccw circles x 2 min, sl 4 way tipping 4 x 10 reps each L/R. Walking lunges: [de-identified]' w/ trunk L/R twists, 2 x 30' w. Calf stretch     SL calf raises w/ heel off slantboard: 3 x 12 R soleus stretch on slant board: 2 min Soleus stretch wt manual guidance: 8 x 10\"  Ankle 3 way resisted: 3 x 15 each D/I/E - gray tband Dynadisc R 3 way tipping: 3 x 12 (standing    Toe walk w/ 20# carry: 4 x 35'  Double dynadisc balance w/ throw /catch: 2 min Toe walk : 2 x 50' Toe walk w/ carry 20#: 8 x 40' Wall march w/ calf raise holds: 30\" L, 15'\" R    Wobble board: CW/CCW circles dl standing x 2 min  DL BOSU balance w/ throw/catch: 2 min Single leg balance w/ rebounder throw/catch: forward x 10 , side x 10, single leg airex x 15 L/R.   Slantboard stretching: soleus x 1 min, gastroc 2 x 1 min Calf raises: R x 15, DL 3 x 20     BOSU: 4 way tipping DL  X 10 ( #3) DL wicket hops : 4 x 4 for height, 5 x 4 for speed Single leg R slider lunges: 3 x 10  dynadisx single leg R static hold balance x 1  min R ankle 3 way resisted:  3 x 20 w/ gray tband    Static hold balance Moboboard 2/4 Agility ladder drills: lateral shuffles L/R x 5, Icky shuffles x 4 Single leg hops for distance: 4 x 7 hops each L/R Calf raises: 2 x 12 R off slantboard, 1 x 12 off step Single leg balance on Dynadisc: 2 x 1 min    12\" - 24\" hopping L pattern: DL 6 forw/bkwd and side - side 3 each Single leg hops L pattern 24 to 12\" x 8 sets of 9 reps L/R. Single leg lateral hops off/on 2\" airex pad: 4 reps (2 sets)  Calf stretch: 3 x 30\" on incline. 12\" 24\" sl hopping; 3 each forw, bkwd, L, R . SL 6 each L / R side hops  Slantboard stretch x 90 sec knee ext, 60 \" flexed       Slantboardstretching gastroc/soleus, 3 min. Fibular post glide spiral taping for ankle mobility (worn during treatment today)               Proprioceptive Activities:                                Manual Therapy: 8 min 8 min 9 min. 10  min 8 min. Joint mobs: Talocrural distraction gr. 3-4-5, distal fib post glides gr. 3-4, med/latl colum glides gr. 3 Joint mobs: TC distraction, midtarsal glides, cuboid dorsal glide, distal fib post glides. (all gr. 3-4) Joint mobs: TC distraction, midtarsal glides, distal fib post glides. (all gr. 3-4) Joint mobs: TC distraction, midtarsal glides,, cuboid dorsal glide all gr. 3, ankle circles assisted Joint mobs: TC distraction, midtarsal glides, passive midfoot inv/ever and s/t rotations all gr. 3.        IASTM dorsal lateral forefoot x 4 min    Therapeutic Activities:                                  HEP: see 2/17/22 flow sheet for specifics.  (calf stretch at wall knees straight and bent 1 min ea), SLS with EC [id number], toe raise 3x15    [facility] Portal  Treatment/Session Summary:    · Response to Treatment:  Calf raises are performed on R w/ lateral foot loading - suspect that this is part of pt's dorsal foot pain problem - these reproduced his lateral foot pain. Double leg raises are preoformed with better technique and no pain. Hop activities avoided today due to pain. · Communication/Consultation:  None today  · Equipment provided today:  None today  Recommendations/Intent for next treatment session: Next visit will focus on progression of strength and return to prior level of function. Single leg hop activities.       Total Treatment Billable Duration:  45 minutes  PT Patient Time In/Time Out  Time In: 4361  Time Out: [address], PT    Future Appointments   Date Time Provider Dulce Maria Shafer   4/6/2022  2:45 PM Rhett Young, PT [facility]   4/8/2022  2:45 PM Rhett Young, PT [facility]   4/11/2022 10:15 AM Rhett Young, PT [facility]   4/13/2022  2:45 PM Rhett Young, PT [facility]   4/20/2022  2:45 PM Rhett Young, PT [facility]   4/22/2022  2:45 PM Rhett Young, PT [facility]   4/27/2022  2:45 PM Rhett Young, PT [facility]   4/29/2022  2:45 PM Angelina Underwood, PT [facility]

## 2022-04-06 ENCOUNTER — HOSPITAL ENCOUNTER (OUTPATIENT)
Dept: PHYSICAL THERAPY | Age: 22
Discharge: HOME | End: 2022-04-06
Payer: COMMERCIAL

## 2022-04-06 PROCEDURE — 97140 MANUAL THERAPY 1/> REGIONS: CPT

## 2022-04-06 PROCEDURE — 97016 VASOPNEUMATIC DEVICE THERAPY: CPT

## 2022-04-06 PROCEDURE — 97110 THERAPEUTIC EXERCISES: CPT

## 2022-04-06 NOTE — PROGRESS NOTES
Ed Barclay  : 2000  Primary: [address] [facility]*  Secondary: [place] [address] @ [address]  [address] [place] [address]  [phone]   [phone]      OUTPATIENT PHYSICAL THERAPY:  Daily Treatment Note  2022    ICD-10: Treatment Diagnosis: Pain in right foot (M79.671) and Stiffness of right foot, not elsewhere classified (M25.674) and Difficulty in walking, not elsewhere classified (R26.2)  Effective Dates: 2022 TO 2022 (90 days). Frequency/Duration: 3 times a week for 90 Days  GOALS: (Goals have been discussed and agreed upon with patient.)  Short-Term Functional Goals: Time Frame: 2 weeks  1. Patient will be independent with HEP. (MET)  2. Patient will restore ankle mobility to symmetric with uninvolved as measured by weight-bearing lunge test to normalize gait on uneven surfaces.(MET)   3. Patient will report pain <2/10 along incision sites to decrease sensitivity. (MET)  Discharge Goals: Time Frame: 4 weeks  1. Patient will be able to perform 30 SL heel raises to normalize capacity of the calf for return to prior level of function. 2. Patient will demonstrate symmetric SL hop and stick to normalize dynamic stability of the limb. 3. Patient will have LEFS of 80/80 to return to prior level of function. _________________________________________________________________________  Pre-treatment Symptoms/Complaints: Tired today from weighttraining prior to PT. Pain with running - anterolateral ankle, dorsal-lateral midfoot     Pain: Initial: 210    Hardware removal on: 22 Post Session:  No increase/10   Medications Last Reviewed:  2022  Updated Objective Findings:  Below measures from initial evaluation unless otherwise noted. Observation/Orthostatic Postural Assessment:    Incision sites are well healed. Ambulates with mild decreased ankle rocker during right stance.    Girth: 40.5cm R, 44cm L at calf; 29.5cm R, 28.5cm L at midfoot; figure 8 is 58cm B  Palpation:    Mild tenderness along incision sites  ROM:    WBLT: 30 ° R, 35 ° L  Soleus length is 10 ° in prone  Inversion, eversion, and plantarflexion are WNL  Great toe extension is 60 ° B   Hip extension and quad length are restricted  Strength: Ankle DF: 4/5 R; 5/5 L  Inversion: 4/5 R with pain; 5/5 L  Eversion: 5/5 B  Plantarflexion 3/5 R with pain; 5/5 L  Special Tests:    Not applicable  Neurological Screen:  Grossly intact  Functional Mobility:   WNL  Balance:    Maintains single leg stance 5sec on R; 10+ on L  LEFS: 64/80  3/17/22: dorsiflexion rocker = 30 deg pre-mob/taping, 35 deg after. 3/22/22: Dorsiflexion rocker = 38 deg after stretch/mob. Single leg calf raise endurance: 20 reps while off step holding 20# L/R, with fatigue/lower excursions on R during last few reps. 4/6/22: Dorsiflexion rocker: left = 35 deg, right = 38 deg. Single leg calf raise reps = 10 reps R, 12+left     TREATMENT:   THERAPEUTIC ACTIVITY: ( see below for minutes): Therapeutic activities per grid below to improve mobility, strength, balance and coordination. Required minimal visual, verbal, manual and tactile cues to improve independence and safety with daily activities . THERAPEUTIC EXERCISE: (see below for minutes):  Exercises per grid below to improve mobility, strength, balance and coordination. Required minimal verbal and manual cues to promote proper body alignment, promote proper body posture and promote proper body mechanics. Progressed resistance, range, repetitions and complexity of movement as indicated. MANUAL THERAPY: (see below for minutes): Joint mobilization and Soft tissue mobilization was utilized and necessary because of the patient's restricted joint motion, painful spasm, loss of articular motion and restricted motion of soft tissue.    MODALITIES: (see below for minutes):      to decrease pain    SELF CARE: (see below for minutes): Procedure(s) (per grid) utilized to improve and/or restore self-care/home management as related to dressing, bathing and grooming. Required minimal verbal cueing to facilitate activities of daily living skills and compensatory activities. Date: 3/17/22 (visit 7) 3/22/22 (visit 8/PN) 3/24/22 (visit 9) 3/29/22 (visit 10) 3/31/22 (visit 11)   4/6/22 (visit 12)   Modalities:  15 min 15 min 15 min 15 min 15 min     Gameready vaso/cry R foot/ankle x 15 min Gameready vaso/cry R foot/ankle x 15 min Gameready vaso/cry R foot/ankle x 15 min Gameready vaso/cry R foot/ankle x 15 min Gameready vaso/cry R foot/ankle x 15 min                     Therapeutic Exercise: 37 min 35 min 36 min 35 min 22 min 30 min    Soleus stretch (standing lunge) 2 min x 2 Standing dynadisc; dl cw/ccw circles 3 x 15, sl 4 way tipping L/R 10 reps Standing wobbleboar dl cw/ccw circles 3 x 15, sl 4 way tipping L/R 2 x  10 reps Irregular surface forw/bkwd walking (very short steps) x 10 x 4' each Dynadisc : standing DL cw / ccw circles : 30 each. Ankle 3 way: 2 x 15-20 each D/I/E w/ gray tband    Walking lunges (heel down for calf stretch): 35' x 4 Single leg calf raises: off step w/ 20#: 20 reps each Single leg calf raise R : 30x  Dynadisc: standing DL cw/ccw circles x 2 min, sl 4 way tipping 4 x 10 reps each L/R. Walking lunges: [de-identified]' w/ trunk L/R twists, 2 x 30' w.  Calf stretch  Walking lunges: 80', w/ heel down for ankle mobility    SL calf raises w/ heel off slantboard: 3 x 12 R soleus stretch on slant board: 2 min Soleus stretch wt manual guidance: 8 x 10\"  Ankle 3 way resisted: 3 x 15 each D/I/E - gray tband Dynadisc R 3 way tipping: 3 x 12 (standing WObbleboard[de-identified] slow reps cw/ccw dl circles standing:  2 x 5 cw/ccw, standing    Toe walk w/ 20# carry: 4 x 35'  Double dynadisc balance w/ throw /catch: 2 min Toe walk : 2 x 50' Toe walk w/ carry 20#: 8 x 40' Wall march w/ calf raise holds: 30\" L, 15'\" R Toe curls over edge of foam pad: 100x L w/ gray tband resist R foot    Wobble board: CW/CCW circles dl standing x 2 min  DL BOSU balance w/ throw/catch: 2 min Single leg balance w/ rebounder throw/catch: forward x 10 , side x 10, single leg airex x 15 L/R. Slantboard stretching: soleus x 1 min, gastroc 2 x 1 min Calf raises: R x 15, DL 3 x 20  Calf raises single leg :3 x 10     BOSU: 4 way tipping DL  X 10 ( #3) DL wicket hops : 4 x 4 for height, 5 x 4 for speed Single leg R slider lunges: 3 x 10  dynadisx single leg R static hold balance x 1  min R ankle 3 way resisted:  3 x 20 w/ gray tband Calf raise isometrics : 2 x 30\" each L/R    Static hold balance Moboboard 2/4 Agility ladder drills: lateral shuffles L/R x 5, Icky shuffles x 4 Single leg hops for distance: 4 x 7 hops each L/R Calf raises: 2 x 12 R off slantboard, 1 x 12 off step Single leg balance on Dynadisc: 2 x 1 min Seated R calf raises : 150# x 20, 3 x 10 w/ 175#    12\" - 24\" hopping L pattern: DL 6 forw/bkwd and side - side 3 each Single leg hops L pattern 24 to 12\" x 8 sets of 9 reps L/R. Single leg lateral hops off/on 2\" airex pad: 4 reps (2 sets)  Calf stretch: 3 x 30\" on incline. 12\" 24\" sl hopping; 3 each forw, bkwd, L, R . SL 6 each L / R side hops  Slantboard stretch x 90 sec knee ext, 60 \" flexed        Slantboardstretching gastroc/soleus, 3 min. Fibular post glide spiral taping for ankle mobility (worn during treatment today)                 Proprioceptive Activities:                                    Manual Therapy: 8 min 8 min 9 min. 10  min 8 min. 12 min    Joint mobs: Talocrural distraction gr. 3-4-5, distal fib post glides gr. 3-4, med/latl colum glides gr. 3 Joint mobs: TC distraction, midtarsal glides, cuboid dorsal glide, distal fib post glides. (all gr. 3-4) Joint mobs: TC distraction, midtarsal glides, distal fib post glides.  (all gr. 3-4) Joint mobs: TC distraction, midtarsal glides,, cuboid dorsal glide all gr. 3, ankle circles assisted Joint mobs: TC distraction, midtarsal glides, passive midfoot inv/ever and s/t rotations all gr. 3.  Joint mobs: TC and S/T distraction, distal fibula post glides gr. 4, , MWM during weightbearing dorsiflexion (30 x 5\"), gr. 5 distraction,        IASTM dorsal lateral forefoot x 4 min     Therapeutic Activities:                                      HEP: see 2/17/22 flow sheet for specifics. (calf stretch at wall knees straight and bent 1 min ea), SLS with EC [id number], toe raise 3x15    [facility]  Treatment/Session Summary:    · Response to Treatment: Single leg calf raise endurance and  Excursion are improving slowly. Dorsiflexion rocker now adequate  (38 deg) but with anterolateral talocrural impingement symptoms. Further work needed on midfoot mobility to lessen talocrural stress. · Communication/Consultation:  None today  · Equipment provided today:  None today  Recommendations/Intent for next treatment session: Next visit will focus on progression of strength and return to prior level of function. Single leg hop activities.       Total Treatment Billable Duration:  57 minutes  PT Patient Time In/Time Out  Time In: 1904  Time Out: 1717 [facility],     Future Appointments   Date Time Provider Dulce Maria Shafer   4/8/2022  2:45 PM [facility]   4/11/2022 10:15 AM Jenna Vance, PT [facility]   4/13/2022  2:45 PM Honey Labrum, PT [facility]   4/20/2022  2:45 PM Honey Labrum, PT [facility]   4/22/2022  2:45 PM Honey Labrum, PT [facility]   4/27/2022  2:45 PM Honey Labrum, PT [facility]   4/29/2022  2:45 PM Jenna Underwood, PT [facility]

## 2022-04-08 ENCOUNTER — HOSPITAL ENCOUNTER (OUTPATIENT)
Dept: PHYSICAL THERAPY | Age: 22
Discharge: HOME | End: 2022-04-08
Payer: COMMERCIAL

## 2022-04-08 PROCEDURE — 97110 THERAPEUTIC EXERCISES: CPT

## 2022-04-08 PROCEDURE — 97140 MANUAL THERAPY 1/> REGIONS: CPT

## 2022-04-08 PROCEDURE — 97016 VASOPNEUMATIC DEVICE THERAPY: CPT

## 2022-04-08 NOTE — PROGRESS NOTES
[facility] Medicine Services  DISCHARGE SUMMARY       Date of Admission: 3/29/2017  Date of Discharge:  4/1/2017  Primary Care Physician: Lu Bhatti DO    Presenting Problem/History of Present Illness:  Pneumonia [J18.9]       Final Discharge Diagnoses:  Hospital Problem List     * (Principal)Pneumonia    Nicotine dependence (Chronic)          Consults:   Consults     No orders found from 2/28/2017 to 3/30/2017.          Procedures Performed:                 Pertinent Test Results:   Lab Results (last 24 hours)     Procedure Component Value Units Date/Time    Blood Culture [[id number]]  (Normal) Collected:  03/29/17 1553    Specimen:  Blood from Arm, Left Updated:  03/31/17 1701     Blood Culture No growth at 2 days    Blood Culture [[id number]]  (Normal) Collected:  03/29/17 1551    Specimen:  Blood from Arm, Right Updated:  03/31/17 1701     Blood Culture No growth at 2 days    CBC Auto Differential [[id number]]  (Normal) Collected:  04/01/17 0655    Specimen:  Blood Updated:  04/01/17 0804     WBC 5.62 10*3/mm3      RBC 4.35 10*6/mm3      Hemoglobin 13.0 g/dL      Hematocrit 39.4 %      MCV 90.6 fL      MCH 29.9 pg      MCHC 33.0 g/dL      RDW 12.5 %      RDW-SD 41.4 fl      MPV 10.5 fL      Platelets 233 10*3/mm3      Neutrophil % 52.5 %      Lymphocyte % 35.6 %      Monocyte % 8.5 %      Eosinophil % 2.5 %      Basophil % 0.5 %      Immature Grans % 0.4 %      Neutrophils, Absolute 2.95 10*3/mm3      Lymphocytes, Absolute 2.00 10*3/mm3      Monocytes, Absolute 0.48 10*3/mm3      Eosinophils, Absolute 0.14 10*3/mm3      Basophils, Absolute 0.03 10*3/mm3      Immature Grans, Absolute 0.02 10*3/mm3     Magnesium [[id number]]  (Normal) Collected:  04/01/17 0655    Specimen:  Blood Updated:  04/01/17 0816     Magnesium 2.2 mg/dL     Comprehensive Metabolic Panel [[id number]]  (Abnormal) Collected:  04/01/17 0655    Specimen:  Blood Updated:  04/01/17 0816     Glucose 80 mg/dL      Shay Weller Coello  : 2000  Primary: [address] [facility]  Secondary: [place]  [phone]   [phone]      OUTPATIENT PHYSICAL THERAPY:  Daily Treatment Note  2022    ICD-10: Treatment Diagnosis: Pain in right foot (M79.671) and Stiffness of right foot, not elsewhere classified (M25.674) and Difficulty in walking, not elsewhere classified (R26.2)  Effective Dates: 2022 TO 2022 (90 days). Frequency/Duration: 3 times a week for 90 Days  GOALS: (Goals have been discussed and agreed upon with patient.)  Short-Term Functional Goals: Time Frame: 2 weeks  1. Patient will be independent with HEP. (MET)  2. Patient will restore ankle mobility to symmetric with uninvolved as measured by weight-bearing lunge test to normalize gait on uneven surfaces.(MET)   3. Patient will report pain <2/10 along incision sites to decrease sensitivity. (MET)  Discharge Goals: Time Frame: 4 weeks  1. Patient will be able to perform 30 SL heel raises to normalize capacity of the calf for return to prior level of function. 2. Patient will demonstrate symmetric SL hop and stick to normalize dynamic stability of the limb. 3. Patient will have LEFS of 80/80 to return to prior level of function. _________________________________________________________________________  Pre-treatment Symptoms/Complaints: Better- Ingrown R 1st toenail was removed, so toe no longer hurting. Has been wearing iontopatch since yesterday afternoon, foot feels better, no pain. Pain: Initial: 0/10    Hardware removal on: 22 Post Session:  No increase/10   Medications Last Reviewed:  2022  Updated Objective Findings:  Below measures from initial evaluation unless otherwise noted. Observation/Orthostatic Postural Assessment:    Incision sites are well healed.  Ambulates with mild decreased ankle  BUN 11 mg/dL      Creatinine 0.72 mg/dL      Sodium 140 mmol/L      Potassium 4.0 mmol/L      Chloride 106 mmol/L      CO2 27.0 mmol/L      Calcium 8.5 mg/dL      Total Protein 5.2 (L) g/dL      Albumin 3.10 (L) g/dL      ALT (SGPT) 25 U/L      AST (SGOT) 22 U/L      Alkaline Phosphatase 69 U/L      Total Bilirubin 0.5 mg/dL      eGFR Non African Amer 80 mL/min/1.73      Globulin 2.1 (L) gm/dL      A/G Ratio 1.5 g/dL      BUN/Creatinine Ratio 15.3     Anion Gap 7.0 mmol/L         Imaging Results (last 24 hours)     Procedure Component Value Units Date/Time    XR Chest PA & Lateral [[id number]] Collected:  04/01/17 0843     Updated:  04/01/17 0846    Narrative:       Patient Name:  FLETCHER ZUÑIGA  Patient ID:  [id number]   Ordering:  GAVIOTA MATSON  Attending:  SEAMUS ROMERO  Referring:  GAVIOTA MATSON  ------------------------------------------------      PROCEDURE: Chest PA and lateral    REASON FOR EXAM: Pneumonia    FINDINGS: Comparison study dated March 29, 2017. . Cardiac and  pulmonary vasculature are normal . Lungs are clear. Pleural  spaces are normal . No acute osseous abnormality.      Impression:       No acute cardiopulmonary abnormality.    Electronically signed by:  Sage Medina MD  4/1/2017 8:44 AM CDT  Workstation: [id number]            Chief Complaint on Day of Discharge: none    Hospital Course:  71-year-old  female who presented to the emergency department on March 29, 2017 with complaints of shortness of breath, fever, and chills.  Prior to admission she was treated in a local urgent care for community-acquired pneumonia with Levaquin but failed outpatient treatment.  She was admitted to the hospital to be treated with IV antibiotics, oxygen, and nebulizer treatments.  The patient was treated with Rocephin and azithromycin during her hospital stay as well as albuterol treatments.  The patient's condition has greatly improved with IV antibiotic therapy and  rocker during right stance. Girth: 40.5cm R, 44cm L at calf; 29.5cm R, 28.5cm L at midfoot; figure 8 is 58cm B  Palpation:    Mild tenderness along incision sites  ROM:    WBLT: 30 ° R, 35 ° L  Soleus length is 10 ° in prone  Inversion, eversion, and plantarflexion are WNL  Great toe extension is 60 ° B   Hip extension and quad length are restricted  Strength: Ankle DF: 4/5 R; 5/5 L  Inversion: 4/5 R with pain; 5/5 L  Eversion: 5/5 B  Plantarflexion 3/5 R with pain; 5/5 L  Special Tests:    Not applicable  Neurological Screen:  Grossly intact  Functional Mobility:   WNL  Balance:    Maintains single leg stance 5sec on R; 10+ on L  LEFS: 64/80  3/17/22: dorsiflexion rocker = 30 deg pre-mob/taping, 35 deg after. 3/22/22: Dorsiflexion rocker = 38 deg after stretch/mob. Single leg calf raise endurance: 20 reps while off step holding 20# L/R, with fatigue/lower excursions on R during last few reps. 4/6/22: Dorsiflexion rocker: left = 35 deg, right = 38 deg. Single leg calf raise reps = 10 reps R, 12+left     TREATMENT:   THERAPEUTIC ACTIVITY: ( see below for minutes): Therapeutic activities per grid below to improve mobility, strength, balance and coordination. Required minimal visual, verbal, manual and tactile cues to improve independence and safety with daily activities . THERAPEUTIC EXERCISE: (see below for minutes):  Exercises per grid below to improve mobility, strength, balance and coordination. Required minimal verbal and manual cues to promote proper body alignment, promote proper body posture and promote proper body mechanics. Progressed resistance, range, repetitions and complexity of movement as indicated. MANUAL THERAPY: (see below for minutes): Joint mobilization and Soft tissue mobilization was utilized and necessary because of the patient's restricted joint motion, painful spasm, loss of articular motion and restricted motion of soft tissue.    MODALITIES: (see below for minutes):      to "she is now able to perform her activities without complaint and she is weaned off of oxygen.  She will be discharged home today with oral azithromycin and cephalosporins to complete her antibiotic treatment.  She has nebulizer machine at home with when necessary albuterol treatments if needed.  The patient's been instructed to follow-up with her primary care provider within one week and to return to the ED if experiencing any issues or to call her primary care provider.  She'll be discharged home today in stable condition.      Condition on Discharge:  Stable    Physical Exam on Discharge:  /72 (BP Location: Right arm, Patient Position: Lying)  Pulse 71  Temp 97.8 °F (36.6 °C) (Oral)   Resp 18  Ht 65\" (165.1 cm)  Wt 122 lb 6 oz (55.5 kg)  SpO2 95%  BMI 20.36 kg/m2  Physical Exam   Constitutional: She is oriented to person, place, and time. She appears well-developed and well-nourished.   HENT:   Head: Normocephalic.   Eyes: Conjunctivae are normal.   Neck: Neck supple.   Cardiovascular: Normal rate, regular rhythm, normal heart sounds and intact distal pulses.  Exam reveals no gallop and no friction rub.    No murmur heard.  Pulmonary/Chest: Effort normal and breath sounds normal. No respiratory distress. She has no wheezes. She has no rales. She exhibits no tenderness.   Abdominal: Soft. Bowel sounds are normal.   Musculoskeletal: Normal range of motion.   Neurological: She is alert and oriented to person, place, and time.   Skin: Skin is warm and dry.   Psychiatric: She has a normal mood and affect. Her behavior is normal.   Vitals reviewed.        Discharge Disposition:  Home or Self Care    Discharge Medications:   Jocelin Molina   Home Medication Instructions FÁTIMA:[id number]    Printed on:04/01/17 1110   Medication Information                      albuterol (PROVENTIL) (2.5 MG/3ML) 0.083% nebulizer solution  Take 2.5 mg by nebulization Every 4 (Four) Hours As Needed for Wheezing.           " decrease pain    SELF CARE: (see below for minutes): Procedure(s) (per grid) utilized to improve and/or restore self-care/home management as related to dressing, bathing and grooming. Required minimal verbal cueing to facilitate activities of daily living skills and compensatory activities. Date: 3/17/22 (visit 7) 3/22/22 (visit 8/PN) 3/24/22 (visit 9) 3/29/22 (visit 10) 3/31/22 (visit 11)   4/6/22 (visit 12) 4/8/22 (visit 13)   Modalities:  15 min 15 min 15 min 15 min 15 min 15 min     Gameready vaso/cry R foot/ankle x 15 min Gameready vaso/cry R foot/ankle x 15 min Gameready vaso/cry R foot/ankle x 15 min Gameready vaso/cry R foot/ankle x 15 min Gameready vaso/cry R foot/ankle x 15 min Gameready vaso/cry R foot/ankle x 15 min                       Therapeutic Exercise: 37 min 35 min 36 min 35 min 22 min 30 min 32 min    Soleus stretch (standing lunge) 2 min x 2 Standing dynadisc; dl cw/ccw circles 3 x 15, sl 4 way tipping L/R 10 reps Standing wobbleboar dl cw/ccw circles 3 x 15, sl 4 way tipping L/R 2 x  10 reps Irregular surface forw/bkwd walking (very short steps) x 10 x 4' each Dynadisc : standing DL cw / ccw circles : 30 each. Ankle 3 way: 2 x 15-20 each D/I/E w/ gray tband Dynadisc R standing single leg: 4 x 15 each cw/ccw     Walking lunges (heel down for calf stretch): 35' x 4 Single leg calf raises: off step w/ 20#: 20 reps each Single leg calf raise R : 30x  Dynadisc: standing DL cw/ccw circles x 2 min, sl 4 way tipping 4 x 10 reps each L/R. Walking lunges: [de-identified]' w/ trunk L/R twists, 2 x 30' w.  Calf stretch  Walking lunges: 80', w/ heel down for ankle mobility airex sl balance -4 way touches w/ opp leg: 2 x 5    SL calf raises w/ heel off slantboard: 3 x 12 R soleus stretch on slant board: 2 min Soleus stretch wth manual guidance: 8 x 10\"  Ankle 3 way resisted: 3 x 15 each D/I/E - gray tband Dynadisc R 3 way tipping: 3 x 12 (standing WObbleboard[de-identified] slow reps cw/ccw dl circles standing:  2 x 5 cw/ccw,   aspirin 81 MG EC tablet  Take 81 mg by mouth Daily.             azithromycin (ZITHROMAX) 250 MG tablet  Take 2 tablets the first day, then 1 tablet daily for 4 days.  Indications: Pneumonia             Budesonide (UCERIS PO)  Take 9 mg by mouth 2 (Two) Times a Day.             Calcium Carbonate-Vitamin D3 600-400 MG-UNIT tablet  Take 1,250 mg by mouth Daily.             cefuroxime (CEFTIN) 500 MG tablet  Take 1 tablet by mouth 2 (Two) Times a Day.             cetirizine (zyrTEC) 10 MG tablet  Take 10 mg by mouth Daily.             Red Yeast Rice 600 MG capsule  Take  by mouth.             traMADol (ULTRAM) 50 MG tablet  Take 1/2-1 tab po daily prn for pain                 Discharge Diet:   Diet Instructions     Diet: Cardiac; Thin Liquids, No Restrictions       Discharge Diet:  Cardiac   Fluid Consistency:  Thin Liquids, No Restrictions                 Activity at Discharge:   Activity Instructions     Activity as Tolerated                     Discharge Care Plan/Instructions: Follow-up with primary care provider within one week of discharge.    Follow-up Appointments:   Future Appointments  Date Time Provider Department Center   4/7/2017 1:45 PM Lu Bhatti DO [facility] None       Test Results Pending at Discharge:  Order Current Status    Blood Culture Preliminary result    Blood Culture Preliminary result          Marychuy WolfeÁNGEL tanner  04/01/17  11:10 AM       I visited patient.  Patient doesn't have any complaints.  Feeling good.  On exam lungs few rhonchi noted.  No wheezing.  No distress.  Vital signs are stable.  Reviewed above assessment and plan and agreed with that.  Chart reviewed.           standing Walking lunges w/ toe rocker : 2 x 50'    Toe walk w/ 20# carry: 4 x 35'  Double dynadisc balance w/ throw /catch: 2 min Toe walk : 2 x 50' Toe walk w/ carry 20#: 8 x 40' Wall march w/ calf raise holds: 30\" L, 15'\" R Toe curls over edge of foam pad: 100x L w/ gray tband resist R foot Isometric calf raise holds: 10 x 10\" each L/R    Wobble board: CW/CCW circles dl standing x 2 min  DL BOSU balance w/ throw/catch: 2 min Single leg balance w/ rebounder throw/catch: forward x 10 , side x 10, single leg airex x 15 L/R. Slantboard stretching: soleus x 1 min, gastroc 2 x 1 min Calf raises: R x 15, DL 3 x 20  Calf raises single leg :3 x 10  Bosu lunges: 2 x 10 L/R    BOSU: 4 way tipping DL  X 10 ( #3) DL wicket hops : 4 x 4 for height, 5 x 4 for speed Single leg R slider lunges: 3 x 10  dynadisx single leg R static hold balance x 1  min R ankle 3 way resisted:  3 x 20 w/ gray tband Calf raise isometrics : 2 x 30\" each L/R DL hops: 4 x 6 reps    Static hold balance Moboboard 2/4 Agility ladder drills: lateral shuffles L/R x 5, Icky shuffles x 4 Single leg hops for distance: 4 x 7 hops each L/R Calf raises: 2 x 12 R off slantboard, 1 x 12 off step Single leg balance on Dynadisc: 2 x 1 min Seated R calf raises : 150# x 20, 3 x 10 w/ 175# Double to single hops: 4 x 5 rep R    12\" - 24\" hopping L pattern: DL 6 forw/bkwd and side - side 3 each Single leg hops L pattern 24 to 12\" x 8 sets of 9 reps L/R. Single leg lateral hops off/on 2\" airex pad: 4 reps (2 sets)  Calf stretch: 3 x 30\" on incline. Agility ladder: 4 x side shuffles, 5 x icky shuffles    12\" 24\" sl hopping; 3 each forw, bkwd, L, R . SL 6 each L / R side hops  Slantboard stretch x 90 sec knee ext, 60 \" flexed     Dl calf raises off incline: 40x    Slantboardstretching gastroc/soleus, 3 min. Slantboard stretch: 1 min each knee ext, flexed          Single leg balance on bosu x 2 min.                Fibular post glide spiral taping for ankle mobility (worn during treatment today)                   Proprioceptive Activities:                                        Manual Therapy: 8 min 8 min 9 min. 10  min 8 min. 12 min 8 min    Joint mobs: Talocrural distraction gr. 3-4-5, distal fib post glides gr. 3-4, med/latl colum glides gr. 3 Joint mobs: TC distraction, midtarsal glides, cuboid dorsal glide, distal fib post glides. (all gr. 3-4) Joint mobs: TC distraction, midtarsal glides, distal fib post glides. (all gr. 3-4) Joint mobs: TC distraction, midtarsal glides,, cuboid dorsal glide all gr. 3, ankle circles assisted Joint mobs: TC distraction, midtarsal glides, passive midfoot inv/ever and s/t rotations all gr. 3.  Joint mobs: TC and S/T distraction, distal fibula post glides gr. 4, , MWM during weightbearing dorsiflexion (30 x 5\"), gr. 5 distraction,  Midtarsal/distal met ap glides gr. 3, Ankle PROM circles cw/ccw, cuboid dorsal glides gr. 3       IASTM dorsal lateral forefoot x 4 min      Therapeutic Activities:                                          HEP: see 2/17/22 flow sheet for specifics. (calf stretch at wall knees straight and bent 1 min ea), SLS with EC [id number], toe raise 3x15    [facility] Portal  Treatment/Session Summary:    · Response to Treatment: Much better response today - able to resume double leg hops, unstable surface balance and rapid shuffles, all of which were performed accurately and without pain. Calf strength and endurance improving slowly - less fade noted today during single leg calf raise holds but still not able to sustain a high-plantarflexion calf raise hold through 10 seconds. · Communication/Consultation:  None today  · Equipment provided today:  None today  Recommendations/Intent for next treatment session: Next visit will focus on progression of strength and return to prior level of function. Single leg hop activities.       Total Treatment Billable Duration:  55 minutes  PT Patient Time In/Time Out  Time In: 1445  Time Out: [facility] Appointments   Date Time Provider Dulce Maria Shafer   4/11/2022 10:15 AM Phil Barber, PT [facility]   4/13/2022  2:45 PM Phil Barber, PT [facility]   4/20/2022  2:45 PM Phil Barber, PT [facility]   4/22/2022  2:45 PM Phil Barber, PT [facility]   4/27/2022  2:45 PM [facility]   4/29/2022  2:45 PM Joanie Vigil, PT [facility]

## 2022-04-12 ENCOUNTER — APPOINTMENT (OUTPATIENT)
Dept: PHYSICAL THERAPY | Age: 22
End: 2022-04-12
Payer: COMMERCIAL

## 2022-04-13 ENCOUNTER — HOSPITAL ENCOUNTER (OUTPATIENT)
Dept: PHYSICAL THERAPY | Age: 22
Discharge: HOME | End: 2022-04-13
Payer: COMMERCIAL

## 2022-04-13 PROCEDURE — 97110 THERAPEUTIC EXERCISES: CPT

## 2022-04-13 PROCEDURE — 97016 VASOPNEUMATIC DEVICE THERAPY: CPT

## 2022-04-13 PROCEDURE — 97140 MANUAL THERAPY 1/> REGIONS: CPT

## 2022-04-13 NOTE — PROGRESS NOTES
Frantz Barclay  : 2000  Primary: [address]  Secondary: [address] [address].  [phone]   [phone]      OUTPATIENT PHYSICAL THERAPY:  Daily Treatment Note  2022    ICD-10: Treatment Diagnosis: Pain in right foot (M79.671) and Stiffness of right foot, not elsewhere classified (M25.674) and Difficulty in walking, not elsewhere classified (R26.2)  Effective Dates: 2022 TO 2022 (90 days). Frequency/Duration: 3 times a week for 90 Days  GOALS: (Goals have been discussed and agreed upon with patient.)  Short-Term Functional Goals: Time Frame: 2 weeks  1. Patient will be independent with HEP. (MET)  2. Patient will restore ankle mobility to symmetric with uninvolved as measured by weight-bearing lunge test to normalize gait on uneven surfaces.(MET)   3. Patient will report pain <2/10 along incision sites to decrease sensitivity. (MET)  Discharge Goals: Time Frame: 4 weeks  1. Patient will be able to perform 30 SL heel raises to normalize capacity of the calf for return to prior level of function. 2. Patient will demonstrate symmetric SL hop and stick to normalize dynamic stability of the limb. 3. Patient will have LEFS of 80/80 to return to prior level of function. _________________________________________________________________________  Pre-treatment Symptoms/Complaints: No foot pain since receiving iotophoresis on his foot last week. Biggest problem is gettingup on his toes to run, can't stay up on his toes like he should to run properly. Pain: Initial: 0/10    Hardware removal on: 22 Post Session:  No increase/10   Medications Last Reviewed:  2022  Updated Objective Findings:  Below measures from initial evaluation unless otherwise noted. Observation/Orthostatic Postural Assessment:    Incision sites are well healed.  Ambulates with mild decreased ankle rocker during right stance. Girth: 40.5cm R, 44cm L at calf; 29.5cm R, 28.5cm L at midfoot; figure 8 is 58cm B  Palpation:    Mild tenderness along incision sites  ROM:    WBLT: 30 ° R, 35 ° L  Soleus length is 10 ° in prone  Inversion, eversion, and plantarflexion are WNL  Great toe extension is 60 ° B   Hip extension and quad length are restricted  Strength: Ankle DF: 4/5 R; 5/5 L  Inversion: 4/5 R with pain; 5/5 L  Eversion: 5/5 B  Plantarflexion 3/5 R with pain; 5/5 L  Special Tests:    Not applicable  Neurological Screen:  Grossly intact  Functional Mobility:   WNL  Balance:    Maintains single leg stance 5sec on R; 10+ on L  LEFS: 64/80  3/17/22: dorsiflexion rocker = 30 deg pre-mob/taping, 35 deg after. 3/22/22: Dorsiflexion rocker = 38 deg after stretch/mob. Single leg calf raise endurance: 20 reps while off step holding 20# L/R, with fatigue/lower excursions on R during last few reps. 4/6/22: Dorsiflexion rocker: left = 35 deg, right = 38 deg. Single leg calf raise reps = 10 reps R, 12+left     TREATMENT:   THERAPEUTIC ACTIVITY: ( see below for minutes): Therapeutic activities per grid below to improve mobility, strength, balance and coordination. Required minimal visual, verbal, manual and tactile cues to improve independence and safety with daily activities . THERAPEUTIC EXERCISE: (see below for minutes):  Exercises per grid below to improve mobility, strength, balance and coordination. Required minimal verbal and manual cues to promote proper body alignment, promote proper body posture and promote proper body mechanics. Progressed resistance, range, repetitions and complexity of movement as indicated. MANUAL THERAPY: (see below for minutes): Joint mobilization and Soft tissue mobilization was utilized and necessary because of the patient's restricted joint motion, painful spasm, loss of articular motion and restricted motion of soft tissue.    MODALITIES: (see below for minutes):      to decrease pain    SELF CARE: (see below for minutes): Procedure(s) (per grid) utilized to improve and/or restore self-care/home management as related to dressing, bathing and grooming. Required minimal verbal cueing to facilitate activities of daily living skills and compensatory activities. Date: 4/13/22 (visit 14)         Modalities: 15 min          Gameready vaso/cry R foot/ankle x 15 min, 34 deg                             Therapeutic Exercise: 30 min          Dynadisc: R single leg: CW/CCW circles 5 x 10 each. Wobbleboard; R single leg balance  X 1 min          Walking lunges w/ toe rocker 4 x 50'          Y balance: Airex l/R 2 sets (on airex). Single leg R balance w/ 4 way touch LLE 5 sets on aires, 5 sets barefoot. Isometric calf holds7 x 10\" each L/R alternating. Hop progression: single leg lateral 4 hop and \"L\" pattern 7 hops back and forth          Slantboard stretch: 1 min           Double to single R landings: 4 x 5 rep. Proprioceptive Activities:                                        Manual Therapy: 8 min          Ankle PROM circles cw/ccw,Midtarsal/distal met ap glides gr. 3, Talocrural distraction and post glides gr. 3-4                   Therapeutic Activities:                                          HEP: see 2/17/22 flow sheet for specifics. (calf stretch at wall knees straight and bent 1 min ea), SLS with EC [id number], toe raise 3x15    [facility] Portal  Treatment/Session Summary:    · Response to Treatment: Calf raise excursion, strength and endurance all still need work to restore normal right plantarflexor capacity - pt not able to perform full excursion single leg calf raises on right with proper consistency.    · Communication/Consultation:  None today  · Equipment provided today:  None today  Recommendations/Intent for next treatment session: Next visit will focus on progression of strength and return to prior level of function. Single leg hop activities.       Total Treatment Billable Duration:  53 minutes  PT Patient Time In/Time Out  Time In: 2721  Time Out: [address], PT    Future Appointments   Date Time Provider Dulce Maria Shafer   4/20/2022  2:45 PM Baldomero Player, PT [facility]   4/22/2022  2:45 PM Baldomero Player, PT [facility]   4/27/2022  2:45 PM Baldomero Player, PT [facility]   4/29/2022  2:45 PM Rita Horne, PT [facility]

## 2022-04-20 ENCOUNTER — HOSPITAL ENCOUNTER (OUTPATIENT)
Dept: PHYSICAL THERAPY | Age: 22
Discharge: HOME | End: 2022-04-20
Payer: COMMERCIAL

## 2022-04-20 PROCEDURE — 97016 VASOPNEUMATIC DEVICE THERAPY: CPT

## 2022-04-20 PROCEDURE — 97140 MANUAL THERAPY 1/> REGIONS: CPT

## 2022-04-20 PROCEDURE — 97110 THERAPEUTIC EXERCISES: CPT

## 2022-04-20 NOTE — PROGRESS NOTES
Angel Mcneill [place]  : 2000  Primary: [address] [facility]  Secondary: [place] [address]  [phone]   [phone]      OUTPATIENT PHYSICAL THERAPY:  Daily Treatment Note  2022    ICD-10: Treatment Diagnosis: Pain in right foot (M79.671) and Stiffness of right foot, not elsewhere classified (M25.674) and Difficulty in walking, not elsewhere classified (R26.2)  Effective Dates: 2022 TO 2022 (90 days). Frequency/Duration: 3 times a week for 90 Days  GOALS: (Goals have been discussed and agreed upon with patient.)  Short-Term Functional Goals: Time Frame: 2 weeks  1. Patient will be independent with HEP. (MET)  2. Patient will restore ankle mobility to symmetric with uninvolved as measured by weight-bearing lunge test to normalize gait on uneven surfaces.(MET)   3. Patient will report pain <2/10 along incision sites to decrease sensitivity. (MET)  Discharge Goals: Time Frame: 4 weeks  1. Patient will be able to perform 30 SL heel raises to normalize capacity of the calf for return to prior level of function. 2. Patient will demonstrate symmetric SL hop and stick to normalize dynamic stability of the limb. 3. Patient will have LEFS of 80/80 to return to prior level of function. _________________________________________________________________________  Pre-treatment Symptoms/Complaints: Strength improving - able to get up on toes when running now. Notices pressure point on incision site on dorsal metatarsal.     Pain: Initial: 0/10    Hardware removal on: 22 Post Session:  No increase/10   Medications Last Reviewed:  2022  Updated Objective Findings:  Below measures from initial evaluation unless otherwise noted. Observation/Orthostatic Postural Assessment:    Incision sites are well healed.  Ambulates with mild decreased ankle rocker during right stance. Girth: 40.5cm R, 44cm L at calf; 29.5cm R, 28.5cm L at midfoot; figure 8 is 58cm B  Palpation:    Mild tenderness along incision sites  ROM:    WBLT: 30 ° R, 35 ° L  Soleus length is 10 ° in prone  Inversion, eversion, and plantarflexion are WNL  Great toe extension is 60 ° B   Hip extension and quad length are restricted  Strength: Ankle DF: 4/5 R; 5/5 L  Inversion: 4/5 R with pain; 5/5 L  Eversion: 5/5 B  Plantarflexion 3/5 R with pain; 5/5 L  Special Tests:    Not applicable  Neurological Screen:  Grossly intact  Functional Mobility:   WNL  Balance:    Maintains single leg stance 5sec on R; 10+ on L  LEFS: 64/80  3/17/22: dorsiflexion rocker = 30 deg pre-mob/taping, 35 deg after. 3/22/22: Dorsiflexion rocker = 38 deg after stretch/mob. Single leg calf raise endurance: 20 reps while off step holding 20# L/R, with fatigue/lower excursions on R during last few reps. 4/6/22: Dorsiflexion rocker: left = 35 deg, right = 38 deg. Single leg calf raise reps = 10 reps R, 12+left     TREATMENT:   THERAPEUTIC ACTIVITY: ( see below for minutes): Therapeutic activities per grid below to improve mobility, strength, balance and coordination. Required minimal visual, verbal, manual and tactile cues to improve independence and safety with daily activities . THERAPEUTIC EXERCISE: (see below for minutes):  Exercises per grid below to improve mobility, strength, balance and coordination. Required minimal verbal and manual cues to promote proper body alignment, promote proper body posture and promote proper body mechanics. Progressed resistance, range, repetitions and complexity of movement as indicated. MANUAL THERAPY: (see below for minutes): Joint mobilization and Soft tissue mobilization was utilized and necessary because of the patient's restricted joint motion, painful spasm, loss of articular motion and restricted motion of soft tissue.    MODALITIES: (see below for minutes):      to decrease pain    SELF CARE: (see below for minutes): Procedure(s) (per grid) utilized to improve and/or restore self-care/home management as related to dressing, bathing and grooming. Required minimal verbal cueing to facilitate activities of daily living skills and compensatory activities. Date: 4/13/22 (visit 14) 4/20/22 (visit 15)        Modalities: 15 min 15 min         Gameready vaso/cry R foot/ankle x 15 min, 34 deg Gameready vaso/cry R foot/ankle x 15 min, 34 deg                            Therapeutic Exercise: 30 min 33 min         Dynadisc: R single leg: CW/CCW circles 5 x 10 each. Walking lunges: 80 w/ heel down for calf stretch         Wobbleboard; R single leg balance  X 1 min Dynadisc -single leg standing R: cw/ccw circles x 3 x 10 each, 4 way tipping R: 4 x 15 reps         Walking lunges w/ toe rocker 4 x 50' DL stnading calf raises x 40 - heels off platform. Y balance: Airex l/R 2 sets (on airex). Y-balance on airex R : 2 x 10 cycles         Single leg R balance w/ 4 way touch LLE 5 sets on aires, 5 sets barefoot. DL hops in place x 100         Isometric calf holds7 x 10\" each L/R alternating. Runners march calf raise holds: 5 x 10\" L/R, f/b rapid toe marches at wall x 1 min         Hop progression: single leg lateral 4 hop and \"L\" pattern 7 hops back and forth Hop progression\" double to single L/R 2 x 5, bounds x 10         Slantboard stretch: 1 min  Single leg triple hop for distance 3 sets L/R. Double to single R landings: 4 x 5 rep.   Seated calf raises R: 15 each at 175# 182.5# , 200#                                                          Proprioceptive Activities:                                        Manual Therapy: 8 min 8 Min         Ankle PROM circles cw/ccw,Midtarsal/distal met ap glides gr. 3, Talocrural distraction and post glides gr. 3-4 Cross fiber  / IASTM at incision scars x 8 min                  Therapeutic Activities:                                          HEP: see 2/17/22 flow sheet for specifics. (calf stretch at wall knees straight and bent 1 min ea), SLS with EC [id number], toe raise 3x15    [employer] Portal  Treatment/Session Summary:    · Response to Treatment: Triple hop distance within 0.5 meters, able to perform bounds  Symmetrically, but single leg calf raise holds fade quickly on R (within 10 seconds)   · Communication/Consultation:  None today  · Equipment provided today:  None today  Recommendations/Intent for next treatment session: Next visit will focus on progression of strength and return to prior level of function. Single leg hop activities.       Total Treatment Billable Duration:  56 minutes  PT Patient Time In/Time Out  Time In: 7424  Time Out: 1717 [facility],     Future Appointments   Date Time Provider Dulce Maria Shafer   4/22/2022  2:45 PM Kiaaniket Marvin, PT [facility]   4/27/2022  2:45 PM Kia Yon, PT [facility]   4/29/2022  2:45 PM Kia Yon, PT [facility]   5/3/2022  4:15 PM Kia Arlington, PT [facility]   5/11/2022  2:45 PM Kia Arlington, PT [facility]   5/13/2022  2:45 PM Kia Arlington, PT [facility]   5/18/2022  2:45 PM Kia Arlington, PT [facility]   5/20/2022  2:45 PM Kia Yon, PT [facility]   5/25/2022  2:45 PM Kia Yon, PT [facility]   5/27/2022  2:45 PM Daniel Underwood, PT [facility]

## 2022-04-22 ENCOUNTER — HOSPITAL ENCOUNTER (OUTPATIENT)
Dept: PHYSICAL THERAPY | Age: 22
Discharge: HOME | End: 2022-04-22
Payer: COMMERCIAL

## 2022-04-22 PROCEDURE — 97110 THERAPEUTIC EXERCISES: CPT

## 2022-04-22 PROCEDURE — 97016 VASOPNEUMATIC DEVICE THERAPY: CPT

## 2022-04-22 NOTE — PROGRESS NOTES
Megha Barclay  : 2000  Primary: [address] [facility]  Secondary: [place] [address]  [phone]   [phone]      OUTPATIENT PHYSICAL THERAPY:  Daily Treatment Note  2022    ICD-10: Treatment Diagnosis: Pain in right foot (M79.671) and Stiffness of right foot, not elsewhere classified (M25.674) and Difficulty in walking, not elsewhere classified (R26.2)  Effective Dates: 2022 TO 2022 (90 days). Frequency/Duration: 3 times a week for 90 Days  GOALS: (Goals have been discussed and agreed upon with patient.)  Short-Term Functional Goals: Time Frame: 2 weeks  1. Patient will be independent with HEP. (MET)  2. Patient will restore ankle mobility to symmetric with uninvolved as measured by weight-bearing lunge test to normalize gait on uneven surfaces.(MET)   3. Patient will report pain <2/10 along incision sites to decrease sensitivity. (MET)  Discharge Goals: Time Frame: 4 weeks  1. Patient will be able to perform 30 SL heel raises to normalize capacity of the calf for return to prior level of function. (Progressing)  2. Patient will demonstrate symmetric SL hop and stick to normalize dynamic stability of the limb. (progressing)  3. Patient will have LEFS of 80/80 to return to prior level of function. (progressing)    _________________________________________________________________________  Pre-treatment Symptoms/Complaints: Mild foot soreness noted today. Pain: Initial: 0/10    Hardware removal on: 22 Post Session:  No increase/10   Medications Last Reviewed:  2022  Updated Objective Findings:  Below measures from initial evaluation unless otherwise noted. Observation/Orthostatic Postural Assessment:    Incision sites are well healed. Ambulates with mild decreased ankle rocker during right stance.    Girth: 40.5cm R, 44cm L at calf; 29.5cm R, 28.5cm L at midfoot; figure 8 is 58cm B  Palpation:    Mild tenderness along incision sites  ROM:    WBLT: 30 ° R, 35 ° L  Soleus length is 10 ° in prone  Inversion, eversion, and plantarflexion are WNL  Great toe extension is 60 ° B   Hip extension and quad length are restricted  Strength: Ankle DF: 4/5 R; 5/5 L  Inversion: 4/5 R with pain; 5/5 L  Eversion: 5/5 B  Plantarflexion 3/5 R with pain; 5/5 L  Special Tests:    Not applicable  Neurological Screen:  Grossly intact  Functional Mobility:   WNL  Balance:    Maintains single leg stance 5sec on R; 10+ on L  LEFS: 64/80  3/17/22: dorsiflexion rocker = 30 deg pre-mob/taping, 35 deg after. 3/22/22: Dorsiflexion rocker = 38 deg after stretch/mob. Single leg calf raise endurance: 20 reps while off step holding 20# L/R, with fatigue/lower excursions on R during last few reps. 4/6/22: Dorsiflexion rocker: left = 35 deg, right = 38 deg. Single leg calf raise reps = 10 reps R, 12+left  4/22/22: single leg calf raise endurance: left = 25reps, right= 17 reps. Gail Jacques FAAM: ADL subscale = 83/84, Sport subscale = 18/28. TREATMENT:   THERAPEUTIC ACTIVITY: ( see below for minutes): Therapeutic activities per grid below to improve mobility, strength, balance and coordination. Required minimal visual, verbal, manual and tactile cues to improve independence and safety with daily activities . THERAPEUTIC EXERCISE: (see below for minutes):  Exercises per grid below to improve mobility, strength, balance and coordination. Required minimal verbal and manual cues to promote proper body alignment, promote proper body posture and promote proper body mechanics. Progressed resistance, range, repetitions and complexity of movement as indicated.   MANUAL THERAPY: (see below for minutes): Joint mobilization and Soft tissue mobilization was utilized and necessary because of the patient's restricted joint motion, painful spasm, loss of articular motion and restricted motion of soft tissue. MODALITIES: (see below for minutes):      to decrease pain    SELF CARE: (see below for minutes): Procedure(s) (per grid) utilized to improve and/or restore self-care/home management as related to dressing, bathing and grooming. Required minimal verbal cueing to facilitate activities of daily living skills and compensatory activities. Date: 4/13/22 (visit 14) 4/20/22 (visit 15) 4/22/22 (visti 16, PN)       Modalities: 15 min 15 min 15 min        Gameready vaso/cry R foot/ankle x 15 min, 34 deg Gameready vaso/cry R foot/ankle x 15 min, 34 deg Gameready vaso/cry R foot/ankle x 15 min, 34 deg                           Therapeutic Exercise: 30 min 33 min 33 min        Dynadisc: R single leg: CW/CCW circles 5 x 10 each. Walking lunges: 80 w/ heel down for calf stretch Dynadisc -single leg standing R: cw/ccw circles x 3 x 10 each, 4 way tipping R: 4 x 15 reps        Wobbleboard; R single leg balance  X 1 min Dynadisc -single leg standing R: cw/ccw circles x 3 x 10 each, 4 way tipping R: 4 x 15 reps Walking lunges x 4 x 40'        Walking lunges w/ toe rocker 4 x 50' DL stnading calf raises x 40 - heels off platform. Toe walk x 40 '        Y balance: Airex l/R 2 sets (on airex). Y-balance on airex R : 2 x 10 cycles Single leg calf raise set to fatigue. Single leg R balance w/ 4 way touch LLE 5 sets on aires, 5 sets barefoot. DL hops in place x 100 Dynadisc balance R/ 3 way touch opposite foot x 3 x 30         Isometric calf holds7 x 10\" each L/R alternating. Runners march calf raise holds: 5 x 10\" L/R, f/b rapid toe marches at wall x 1 min Slantboard stretch R; 1 min each gastroc, soleus. Hop progression: single leg lateral 4 hop and \"L\" pattern 7 hops back and forth Hop progression\" double to single L/R 2 x 5, bounds x 10 Double to single R hop/landings x 5 x 5        Slantboard stretch: 1 min  Single leg triple hop for distance 3 sets L/R.   Single leg 5 rep hops: 6 sets each L/R Double to single R landings: 4 x 5 rep. Seated calf raises R: 15 each at 175# 182.5# , 200# Seated R single leg calf raises: 182.5#, 200#, 212. 5#: 15 each. Proprioceptive Activities:                                        Manual Therapy: 8 min 8 Min 5 min        Ankle PROM circles cw/ccw,Midtarsal/distal met ap glides gr. 3, Talocrural distraction and post glides gr. 3-4 Cross fiber  / IASTM at incision scars x 8 min Cross fiber  / IASTM at incision scars x 5  min                 Therapeutic Activities:                                          HEP: 4/22/22: Reminders to work on single leg calf raise sets to fatigue each day, with goal to build to 25-30 reps. [employer]  Treatment/Session Summary:    · Response to Treatment: Steady progress with all long term goals, Progress since last progress report has been in developing single leg balance and single leg hop capacity - no pain, near symmetry in technique/excursion. Calf raise endurance improving but not yet normal .   · Communication/Consultation:  None today  · Equipment provided today:  None today  Recommendations/Intent for next treatment session: Next visit will focus on progression of strength and return to prior level of function. Single leg hop activities.       Total Treatment Billable Duration:  53 minutes  PT Patient Time In/Time Out  Time In: 1287  Time Out: [address], PT    Future Appointments   Date Time Provider Dulce Maria Shafer   4/27/2022  2:45 PM Antonieta Paul PT [facility]   4/29/2022  2:45 PM Antonieta Paul PT [facility]   5/3/2022  4:15 PM Rodolfo Underwood, PT [facility]

## 2022-04-29 ENCOUNTER — HOSPITAL ENCOUNTER (OUTPATIENT)
Dept: PHYSICAL THERAPY | Age: 22
Discharge: HOME | End: 2022-04-29
Payer: COMMERCIAL

## 2022-04-29 PROCEDURE — 97110 THERAPEUTIC EXERCISES: CPT

## 2022-04-29 PROCEDURE — 97140 MANUAL THERAPY 1/> REGIONS: CPT

## 2022-04-29 NOTE — PROGRESS NOTES
Vinicio Barclay  : 2000  Primary: [address] [facility]  Secondary: [place] [address] @ [address] [place].  [phone]   [phone]      OUTPATIENT PHYSICAL THERAPY:  Daily Treatment Note  2022    ICD-10: Treatment Diagnosis: Pain in right foot (M79.671) and Stiffness of right foot, not elsewhere classified (M25.674) and Difficulty in walking, not elsewhere classified (R26.2)  Effective Dates: 2022 TO 2022 (90 days). Frequency/Duration: 3 times a week for 90 Days  GOALS: (Goals have been discussed and agreed upon with patient.)  Short-Term Functional Goals: Time Frame: 2 weeks  1. Patient will be independent with HEP. (MET)  2. Patient will restore ankle mobility to symmetric with uninvolved as measured by weight-bearing lunge test to normalize gait on uneven surfaces.(MET)   3. Patient will report pain <2/10 along incision sites to decrease sensitivity. (MET)  Discharge Goals: Time Frame: 4 weeks  1. Patient will be able to perform 30 SL heel raises to normalize capacity of the calf for return to prior level of function. (Progressing)  2. Patient will demonstrate symmetric SL hop and stick to normalize dynamic stability of the limb. (progressing)  3. Patient will have LEFS of 80/80 to return to prior level of function. (progressing)    _________________________________________________________________________  Pre-treatment Symptoms/Complaints: Sore ankle , not sharp pain now. Legs tired from weight training earlier today. Pain: Initial: 0/10    Hardware removal on: 22 Post Session:  No increase/10   Medications Last Reviewed:  2022  Updated Objective Findings:  Below measures from initial evaluation unless otherwise noted. Observation/Orthostatic Postural Assessment:    Incision sites are well healed. Ambulates with mild decreased ankle rocker during right stance. Girth: 40.5cm R, 44cm L at calf; 29.5cm R, 28.5cm L at midfoot; figure 8 is 58cm B  Palpation:    Mild tenderness along incision sites  ROM:    WBLT: 30 ° R, 35 ° L  Soleus length is 10 ° in prone  Inversion, eversion, and plantarflexion are WNL  Great toe extension is 60 ° B   Hip extension and quad length are restricted  Strength: Ankle DF: 4/5 R; 5/5 L  Inversion: 4/5 R with pain; 5/5 L  Eversion: 5/5 B  Plantarflexion 3/5 R with pain; 5/5 L  Special Tests:    Not applicable  Neurological Screen:  Grossly intact  Functional Mobility:   WNL  Balance:    Maintains single leg stance 5sec on R; 10+ on L  LEFS: 64/80  3/17/22: dorsiflexion rocker = 30 deg pre-mob/taping, 35 deg after. 3/22/22: Dorsiflexion rocker = 38 deg after stretch/mob. Single leg calf raise endurance: 20 reps while off step holding 20# L/R, with fatigue/lower excursions on R during last few reps. 4/6/22: Dorsiflexion rocker: left = 35 deg, right = 38 deg. Single leg calf raise reps = 10 reps R, 12+left  4/22/22: single leg calf raise endurance: left = 25reps, right= 17 reps. Rey Rodney FAAM: ADL subscale = 83/84, Sport subscale = 18/28. TREATMENT:   THERAPEUTIC ACTIVITY: ( see below for minutes): Therapeutic activities per grid below to improve mobility, strength, balance and coordination. Required minimal visual, verbal, manual and tactile cues to improve independence and safety with daily activities . THERAPEUTIC EXERCISE: (see below for minutes):  Exercises per grid below to improve mobility, strength, balance and coordination. Required minimal verbal and manual cues to promote proper body alignment, promote proper body posture and promote proper body mechanics. Progressed resistance, range, repetitions and complexity of movement as indicated.   MANUAL THERAPY: (see below for minutes): Joint mobilization and Soft tissue mobilization was utilized and necessary because of the patient's restricted joint motion, painful spasm, loss of articular motion and restricted motion of soft tissue. MODALITIES: (see below for minutes):      to decrease pain    SELF CARE: (see below for minutes): Procedure(s) (per grid) utilized to improve and/or restore self-care/home management as related to dressing, bathing and grooming. Required minimal verbal cueing to facilitate activities of daily living skills and compensatory activities. Date: 4/13/22 (visit 14) 4/20/22 (visit 15) 4/22/22 (visit 16, PN) 4/29/22 (visit 17)      Modalities: 15 min 15 min 15 min        Gameready vaso/cry R foot/ankle x 15 min, 34 deg Gameready vaso/cry R foot/ankle x 15 min, 34 deg Gameready vaso/cry R foot/ankle x 15 min, 34 deg                           Therapeutic Exercise: 30 min 33 min 33 min 35 min       Dynadisc: R single leg: CW/CCW circles 5 x 10 each. Walking lunges: 80 w/ heel down for calf stretch Dynadisc -single leg standing R: cw/ccw circles x 3 x 10 each, 4 way tipping R: 4 x 15 reps Wobbleboard: cw/ccw circles: 10 rep sets x 2  Min, f/b dl side to side tipping x 1 min       Wobbleboard; R single leg balance  X 1 min Dynadisc -single leg standing R: cw/ccw circles x 3 x 10 each, 4 way tipping R: 4 x 15 reps Walking lunges x 4 x 40' DL calf raises off platform x 30       Walking lunges w/ toe rocker 4 x 50' DL stnading calf raises x 40 - heels off platform. Toe walk x 40 ' Lunge  Walking 2 x 40'       Y balance: Airex l/R 2 sets (on airex). Y-balance on airex R : 2 x 10 cycles Single leg calf raise set to fatigue. Single leg calf raises 4 x 10 each L/R       Single leg R balance w/ 4 way touch LLE 5 sets on aires, 5 sets barefoot. DL hops in place x 100 Dynadisc balance R/ 3 way touch opposite foot x 3 x 30  BOSU- static balance L/R 4 x 30\" R, 3 x 30\" L       Isometric calf holds7 x 10\" each L/R alternating. Runners march calf raise holds: 5 x 10\" L/R, f/b rapid toe marches at wall x 1 min Slantboard stretch R; 1 min each gastroc, soleus.   JC DAY half squats 3 x 8       Hop progression: single leg lateral 4 hop and \"L\" pattern 7 hops back and forth Hop progression\" double to single L/R 2 x 5, bounds x 10 Double to single R hop/landings x 5 x 5 Diagonal leg to leg jumps for distance: 4 x 4 rep each LE       Slantboard stretch: 1 min  Single leg triple hop for distance 3 sets L/R. Single leg 5 rep hops: 6 sets each L/R Single leg triple hops 3 sets each       Double to single R landings: 4 x 5 rep. Seated calf raises R: 15 each at 175# 182.5# , 200# Seated R single leg calf raises: 182.5#, 200#, 212. 5#: 15 each. Proprioceptive Activities:                                        Manual Therapy: 8 min 8 Min 5 min 8 min       Ankle PROM circles cw/ccw,Midtarsal/distal met ap glides gr. 3, Talocrural distraction and post glides gr. 3-4 Cross fiber  / IASTM at incision scars x 8 min Cross fiber  / IASTM at incision scars x 5  min Cross fiber  / IASTM at incision scars x 4 min          R talocurural distraction gr. Post glides gr. 3      Therapeutic Activities:                                          HEP: 4/22/22: Reminders to work on single leg calf raise sets to fatigue each day, with goal to build to 25-30 reps. [employer]  Treatment/Session Summary:    · Response to Treatment: Very good progress since last visit - single leg calf raise performance is stronger (now performing full range reps with mild fatigue signs by 10 reps). Single leg hops L/R are performed with only mild asymmetry. · Communication/Consultation:  None today  · Equipment provided today:  None today  Recommendations/Intent for next treatment session: Pt is away for one month (travel), will return for followup check in early June, anticipate D/C at that point.      Total Treatment Billable Duration:  44 minutes  PT Patient Time In/Time Out  Time In: 5455  Time Out: [place] [address], PT    Future Appointments   Date Time Provider Dulce Maria Shafer   5/3/2022  7:00 PM Vishal Underwood, PT [facility]

## 2022-05-03 ENCOUNTER — HOSPITAL ENCOUNTER (OUTPATIENT)
Dept: PHYSICAL THERAPY | Age: 22
Discharge: HOME | End: 2022-05-03

## 2022-05-03 ENCOUNTER — APPOINTMENT (OUTPATIENT)
Dept: PHYSICAL THERAPY | Age: 22
End: 2022-05-03

## 2022-05-10 ENCOUNTER — APPOINTMENT (OUTPATIENT)
Dept: PHYSICAL THERAPY | Age: 22
End: 2022-05-10

## 2022-05-10 NOTE — THERAPY DISCHARGE
Laura Orta [place]  : 2000  Primary: [address] [facility]  Secondary: [place] [address]  [phone]   [phone]      OUTPATIENT PHYSICAL THERAPY:  Discharge Summary  5/10/2022    ICD-10: Treatment Diagnosis: Pain in right foot (M79.671) and Stiffness of right foot, not elsewhere classified (M25.674) and Difficulty in walking, not elsewhere classified (R26.2)  Effective Dates: 2022 TO 2022 (90 days). Frequency/Duration: 3 times a week for 90 Days  GOALS: (Goals have been discussed and agreed upon with patient.)  Short-Term Functional Goals: Time Frame: 2 weeks  1. Patient will be independent with HEP. (MET)  2. Patient will restore ankle mobility to symmetric with uninvolved as measured by weight-bearing lunge test to normalize gait on uneven surfaces.(MET)   3. Patient will report pain <2/10 along incision sites to decrease sensitivity. (MET)  Discharge Goals: Time Frame: 4 weeks  1. Patient will be able to perform 30 SL heel raises to normalize capacity of the calf for return to prior level of function. (Progressing)  2. Patient will demonstrate symmetric SL hop and stick to normalize dynamic stability of the limb. (progressing)  3. Patient will have LEFS of 80/80 to return to prior level of function. (progressing)    _________________________________________________________________________  Pre-treatment Symptoms/Complaints: last visit = 2022: Sore ankle , not sharp pain now. Legs tired from weight training earlier today. Pain: Initial: 0/10    Hardware removal on: 22 Post Session:  No increase/10   Medications Last Reviewed:  5/10/2022  Updated Objective Findings:  Below measures from initial evaluation unless otherwise noted. Observation/Orthostatic Postural Assessment:    Incision sites are well healed.  Ambulates with mild decreased ankle rocker during right stance. Girth: 40.5cm R, 44cm L at calf; 29.5cm R, 28.5cm L at midfoot; figure 8 is 58cm B  Palpation:    Mild tenderness along incision sites  ROM:    WBLT: 30 ° R, 35 ° L  Soleus length is 10 ° in prone  Inversion, eversion, and plantarflexion are WNL  Great toe extension is 60 ° B   Hip extension and quad length are restricted  Strength: Ankle DF: 4/5 R; 5/5 L  Inversion: 4/5 R with pain; 5/5 L  Eversion: 5/5 B  Plantarflexion 3/5 R with pain; 5/5 L  Special Tests:    Not applicable  Neurological Screen:  Grossly intact  Functional Mobility:   WNL  Balance:    Maintains single leg stance 5sec on R; 10+ on L  LEFS: 64/80  3/17/22: dorsiflexion rocker = 30 deg pre-mob/taping, 35 deg after. 3/22/22: Dorsiflexion rocker = 38 deg after stretch/mob. Single leg calf raise endurance: 20 reps while off step holding 20# L/R, with fatigue/lower excursions on R during last few reps. 4/6/22: Dorsiflexion rocker: left = 35 deg, right = 38 deg. Single leg calf raise reps = 10 reps R, 12+left  4/22/22: single leg calf raise endurance: left = 25reps, right= 17 reps. Eder Roman FAAM: ADL subscale = 83/84, Sport subscale = 18/28. TREATMENT:   THERAPEUTIC ACTIVITY: ( see below for minutes): Therapeutic activities per grid below to improve mobility, strength, balance and coordination. Required minimal visual, verbal, manual and tactile cues to improve independence and safety with daily activities . THERAPEUTIC EXERCISE: (see below for minutes):  Exercises per grid below to improve mobility, strength, balance and coordination. Required minimal verbal and manual cues to promote proper body alignment, promote proper body posture and promote proper body mechanics. Progressed resistance, range, repetitions and complexity of movement as indicated.   MANUAL THERAPY: (see below for minutes): Joint mobilization and Soft tissue mobilization was utilized and necessary because of the patient's restricted joint motion, painful spasm, loss of articular motion and restricted motion of soft tissue. MODALITIES: (see below for minutes):      to decrease pain    SELF CARE: (see below for minutes): Procedure(s) (per grid) utilized to improve and/or restore self-care/home management as related to dressing, bathing and grooming. Required minimal verbal cueing to facilitate activities of daily living skills and compensatory activities. Date: 4/13/22 (visit 14) 4/20/22 (visit 15) 4/22/22 (visit 16, PN) 4/29/22 (visit 17)      Modalities: 15 min 15 min 15 min        Gameready vaso/cry R foot/ankle x 15 min, 34 deg Gameready vaso/cry R foot/ankle x 15 min, 34 deg Gameready vaso/cry R foot/ankle x 15 min, 34 deg                           Therapeutic Exercise: 30 min 33 min 33 min 35 min       Dynadisc: R single leg: CW/CCW circles 5 x 10 each. Walking lunges: 80 w/ heel down for calf stretch Dynadisc -single leg standing R: cw/ccw circles x 3 x 10 each, 4 way tipping R: 4 x 15 reps Wobbleboard: cw/ccw circles: 10 rep sets x 2  Min, f/b dl side to side tipping x 1 min       Wobbleboard; R single leg balance  X 1 min Dynadisc -single leg standing R: cw/ccw circles x 3 x 10 each, 4 way tipping R: 4 x 15 reps Walking lunges x 4 x 40' DL calf raises off platform x 30       Walking lunges w/ toe rocker 4 x 50' DL stnading calf raises x 40 - heels off platform. Toe walk x 40 ' Lunge  Walking 2 x 40'       Y balance: Airex l/R 2 sets (on airex). Y-balance on airex R : 2 x 10 cycles Single leg calf raise set to fatigue. Single leg calf raises 4 x 10 each L/R       Single leg R balance w/ 4 way touch LLE 5 sets on aires, 5 sets barefoot. DL hops in place x 100 Dynadisc balance R/ 3 way touch opposite foot x 3 x 30  BOSU- static balance L/R 4 x 30\" R, 3 x 30\" L       Isometric calf holds7 x 10\" each L/R alternating. Runners march calf raise holds: 5 x 10\" L/R, f/b rapid toe marches at wall x 1 min Slantboard stretch R; 1 min each gastroc, soleus. BOSU DL half squats 3 x 8       Hop progression: single leg lateral 4 hop and \"L\" pattern 7 hops back and forth Hop progression\" double to single L/R 2 x 5, bounds x 10 Double to single R hop/landings x 5 x 5 Diagonal leg to leg jumps for distance: 4 x 4 rep each LE       Slantboard stretch: 1 min  Single leg triple hop for distance 3 sets L/R. Single leg 5 rep hops: 6 sets each L/R Single leg triple hops 3 sets each       Double to single R landings: 4 x 5 rep. Seated calf raises R: 15 each at 175# 182.5# , 200# Seated R single leg calf raises: 182.5#, 200#, 212. 5#: 15 each. Proprioceptive Activities:                                        Manual Therapy: 8 min 8 Min 5 min 8 min       Ankle PROM circles cw/ccw,Midtarsal/distal met ap glides gr. 3, Talocrural distraction and post glides gr. 3-4 Cross fiber  / IASTM at incision scars x 8 min Cross fiber  / IASTM at incision scars x 5  min Cross fiber  / IASTM at incision scars x 4 min          R talocurural distraction gr. Post glides gr. 3      Therapeutic Activities:                                          HEP: 4/22/22: Reminders to work on single leg calf raise sets to fatigue each day, with goal to build to 25-30 reps. Lighting by [employer] Portal  Treatment/Session Summary:    · Response to Treatment: At time of last visit, pt had made very good progress towards meeting all goals - single leg calf raise performance is stronger (now performing full range reps with mild fatigue signs by 10 reps). Single leg hops L/R are performed with only mild asymmetry. [place]. · Communication/Consultation:  None today  · Equipment provided today:  None today  Recommendations/Intent for next treatment session: Discharge to home program/ strength and conditioning program.    Brinda Cruz, PT    No future appointments.

## 2022-05-11 ENCOUNTER — APPOINTMENT (OUTPATIENT)
Dept: PHYSICAL THERAPY | Age: 22
End: 2022-05-11

## 2022-05-13 ENCOUNTER — APPOINTMENT (OUTPATIENT)
Dept: PHYSICAL THERAPY | Age: 22
End: 2022-05-13

## 2022-05-18 ENCOUNTER — APPOINTMENT (OUTPATIENT)
Dept: PHYSICAL THERAPY | Age: 22
End: 2022-05-18

## 2022-05-20 ENCOUNTER — APPOINTMENT (OUTPATIENT)
Dept: PHYSICAL THERAPY | Age: 22
End: 2022-05-20

## 2022-05-25 ENCOUNTER — APPOINTMENT (OUTPATIENT)
Dept: PHYSICAL THERAPY | Age: 22
End: 2022-05-25

## 2022-05-27 ENCOUNTER — APPOINTMENT (OUTPATIENT)
Dept: PHYSICAL THERAPY | Age: 22
End: 2022-05-27

## 2022-08-22 ENCOUNTER — OFFICE VISIT (OUTPATIENT)
Dept: ORTHOPEDIC SURGERY | Age: 22
End: 2022-08-22
Payer: COMMERCIAL

## 2022-08-22 VITALS — WEIGHT: 260 LBS | HEIGHT: 74 IN | BODY MASS INDEX: 33.37 KG/M2

## 2022-08-22 DIAGNOSIS — M79.645 FINGER PAIN, LEFT: Primary | ICD-10-CM

## 2022-08-22 PROCEDURE — G8417 CALC BMI ABV UP PARAM F/U: HCPCS | Performed by: ORTHOPAEDIC SURGERY

## 2022-08-22 PROCEDURE — 1036F TOBACCO NON-USER: CPT | Performed by: ORTHOPAEDIC SURGERY

## 2022-08-22 PROCEDURE — 99203 OFFICE O/P NEW LOW 30 MIN: CPT | Performed by: ORTHOPAEDIC SURGERY

## 2022-08-22 PROCEDURE — G8427 DOCREV CUR MEDS BY ELIG CLIN: HCPCS | Performed by: ORTHOPAEDIC SURGERY

## 2022-08-22 NOTE — PROGRESS NOTES
Name: Beba Alicea  YOB: 2000  Gender: male  MRN: [id number]      CC: Pain (L finger)       HPI: Beba Alicea is a 25 y.o. 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 He reports injuring his 4th finger at practice but does not recall what the specific mechanism is. He has some bruising all along his finger but reports most pain at the DIP joint that has continued to hurt with little movements and ADLs. He has been patricia taping his fingers for practice and has been treated by his ATC at [facility]. He plays on the defensive line. ROS/Meds/PSH/PMH/FH/SH: I personally reviewed the patients standard intake form. Below are the pertinents    Tobacco:  reports that he has never smoked. He has never used smokeless tobacco.  Diabetes: none  Other: none    Physical Examination:  General: no acute distress  Lungs: breathing easily  CV: regular rhythm by pulse  Left hand : Ring finger has some swelling over the DIP and mildly over the PIP. He has a healed traumatic scar on the dorsum of the proximal to middle phalanx that he says was from sutures a long time ago. He is able to fire FDP and FDS as well as EDC. He has tenderness to palpation over the DIP joint but no instability. Imaging:   Left hand XR: AP, Lateral, Oblique views     Clinical Indication    ICD-10-CM    1. Finger pain, left  M79.645 XR HAND LEFT (MIN 3 VIEWS)             Report: AP, lateral, oblique x-ray of the left hand demonstrates normal bony architecture mild soft tissue swelling no acute findings    Impression: No acute findings as above     Dolores Montero MD      All imaging interpreted by myself Jone Farmer MD independent of radiology review        Assessment:     ICD-10-CM    1.  Finger pain, left  M79.645 XR HAND LEFT (MIN 3 VIEWS)          Plan:   Appears to be soft tissue DIP sprain with no obvious internal

## 2024-02-02 ENCOUNTER — OFFICE VISIT (OUTPATIENT)
Dept: ORTHOPEDIC SURGERY | Age: 24
End: 2024-02-02

## 2024-02-02 DIAGNOSIS — M54.50 ACUTE RIGHT-SIDED LOW BACK PAIN WITHOUT SCIATICA: Primary | ICD-10-CM

## 2024-02-02 RX ORDER — METHOCARBAMOL 500 MG/1
500 TABLET, FILM COATED ORAL 2 TIMES DAILY PRN
Qty: 30 TABLET | Refills: 0 | Status: SHIPPED | OUTPATIENT
Start: 2024-02-02 | End: 2024-02-17

## 2024-02-02 RX ORDER — DICLOFENAC SODIUM 75 MG/1
75 TABLET, DELAYED RELEASE ORAL 2 TIMES DAILY
Qty: 20 TABLET | Refills: 0 | Status: SHIPPED | OUTPATIENT
Start: 2024-02-02 | End: 2024-02-12

## 2024-02-02 NOTE — PROGRESS NOTES
Name: Don Hogan  YOB: 2000  Gender: male  MRN: [id number]  Date of Encounter:  2/2/2024       CHIEF COMPLAINT:     Chief Complaint   Patient presents with    Lower Back Pain        SUBJECTIVE/OBJECTIVE:      HPI:    Patient is a 23 y.o. pleasant male who presents today for a new evaluation of his low back pain.     Don is a [employer] who has had back pain since Monday after performing prolonged mat exercises in football practice.  He was doing a lot of up-and-down's and speed work.  He has had aching right-sided lower back pain that he is unable to palpate.  It worsened more on Wednesday and he was had difficult time bending or pain with standing.  He got some dry needling done in the training room by Andreas Rao which initially helped.  He has had some pain going down the side of his hip but no radiation down his leg.  After doing bike exercise he noted some scrotal soreness but denies any significant pain there, numbness or tingling, or swelling.  He has had no issues with urination or stooling.  He denies prior back history.  He is taken Advil 2 times for this with some relief.    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     PAST HISTORY:   Past medical, surgical, family, social history and allergies reviewed by me.   Pertinent history:   Tobacco use:  reports that he has never smoked. He has never used smokeless tobacco.  Diabetes: none  CKD: no  Anticoagulation: no      REVIEW OF SYSTEMS:   As noted in HPI.     PHYSICAL EXAMINATION:     Gen: Well-developed, no acute distress   HEENT: NC/AT, EOMI   Neck: Trachea midline, normal ROM   CV: Regular rhythm by palpation of distal pulse, normal capillary refill   Abdomen: soft, nontender, no CVAT  Pulm: No respiratory distress, no stridor   Psychiatric: Well oriented, normal mood and affect.   Skin: No rashes, lesions or ulcers, normal temperature, turgor, and texture on uninvolved extremity.      ORTHO

## (undated) DEVICE — SUTURE MCRYL SZ 3-0 L27IN ABSRB UD L19MM PS-2 3/8 CIR PRIM Y427H

## (undated) DEVICE — FOOT & ANKLE SOFT DR WOMACK: Brand: MEDLINE INDUSTRIES, INC.

## (undated) DEVICE — PREP SKN CHLRAPRP APL 26ML STR --

## (undated) DEVICE — SOLUTION IRRIG 1000ML 09% SOD CHL USP PIC PLAS CONTAINER

## (undated) DEVICE — DRAPE C ARM W54XL84IN MINI FOR OEC 6800

## (undated) DEVICE — BNDG,ELSTC,MATRIX,STRL,4"X5YD,LF,HOOK&LP: Brand: MEDLINE

## (undated) DEVICE — DRILL BIT, AO DIA2.6MM X 135MM, SCALED: Brand: VARIAX

## (undated) DEVICE — SUTURE VCRL SZ 2-0 L27IN ABSRB UD L26MM CT-2 1/2 CIR J269H

## (undated) DEVICE — BANDAGE COBAN 6 IN WND 6INX5YD FOAM

## (undated) DEVICE — 3M™ COBAN™ NL STERILE NON-LATEX SELF-ADHERENT WRAP, 2082S, 2 IN X 5 YD (5 CM X 4,5 M), 36 ROLLS/CASE: Brand: 3M™ COBAN™

## (undated) DEVICE — AMD ANTIMICROBIAL GAUZE SPONGES,12 PLY USP TYPE VII, 0.2% POLYHEXAMETHYLENE BIGUANIDE HCI (PHMB): Brand: CURITY

## (undated) DEVICE — WIRE ORTH 1.1MM DIA 229MM SMOOTH DBL BAYNT TIP S STL K
Type: IMPLANTABLE DEVICE | Site: FOOT | Status: NON-FUNCTIONAL
Removed: 2022-02-01

## (undated) DEVICE — BANDAGE,ELASTIC,ESMARK,STERILE,4"X9',LF: Brand: MEDLINE

## (undated) DEVICE — DRSG GZ OIL EMUL CURAD 3X8 --

## (undated) DEVICE — PAD,ABDOMINAL,5"X9",ST,LF,25/BX: Brand: MEDLINE INDUSTRIES, INC.

## (undated) DEVICE — SPLINT CAST W4XL30IN WHT THMB FBRGLS PRECUT INTLOK WRINKLE

## (undated) DEVICE — REM POLYHESIVE ADULT PATIENT RETURN ELECTRODE: Brand: VALLEYLAB

## (undated) DEVICE — PADDING CAST W2INXL4YD ST COT COHESIVE HND TEARABLE SPEC

## (undated) DEVICE — BANDAGE,GAUZE,CONFORMING,2"X75",STRL,LF: Brand: MEDLINE INDUSTRIES, INC.

## (undated) DEVICE — BUTTON SWITCH PENCIL BLADE ELECTRODE, HOLSTER: Brand: EDGE

## (undated) DEVICE — ZIMMER® STERILE DISPOSABLE TOURNIQUET CUFF WITH PLC, DUAL PORT, SINGLE BLADDER, 18 IN. (46 CM)

## (undated) DEVICE — HOLDING PIN: Brand: ANCHORAGE

## (undated) DEVICE — KIRSCHNER WIRE
Type: IMPLANTABLE DEVICE | Site: FOOT | Status: NON-FUNCTIONAL
Removed: 2021-11-04

## (undated) DEVICE — PADDING CAST W4INXL4YD ST COT COHESIVE HND TEARABLE SPEC

## (undated) DEVICE — CANNULATED DRILL

## (undated) DEVICE — BNDG ELAS ESMARK 4INX12FT LF -- STRL

## (undated) DEVICE — BANDAGE,GAUZE,BULKEE II,4.5"X4.1YD,STRL: Brand: MEDLINE

## (undated) DEVICE — CANNULATED SCREW
Type: IMPLANTABLE DEVICE | Site: FOOT | Status: NON-FUNCTIONAL
Brand: ASNIS
Removed: 2021-11-04